# Patient Record
Sex: FEMALE | Race: WHITE | NOT HISPANIC OR LATINO | Employment: FULL TIME | URBAN - NONMETROPOLITAN AREA
[De-identification: names, ages, dates, MRNs, and addresses within clinical notes are randomized per-mention and may not be internally consistent; named-entity substitution may affect disease eponyms.]

---

## 2022-01-03 ENCOUNTER — OFFICE VISIT (OUTPATIENT)
Dept: NEUROLOGY | Facility: CLINIC | Age: 22
End: 2022-01-03

## 2022-01-03 VITALS
SYSTOLIC BLOOD PRESSURE: 120 MMHG | OXYGEN SATURATION: 99 % | HEART RATE: 77 BPM | HEIGHT: 68 IN | WEIGHT: 255 LBS | TEMPERATURE: 98.2 F | DIASTOLIC BLOOD PRESSURE: 80 MMHG | BODY MASS INDEX: 38.65 KG/M2

## 2022-01-03 DIAGNOSIS — G40.909 SEIZURE DISORDER: Primary | ICD-10-CM

## 2022-01-03 PROCEDURE — 99203 OFFICE O/P NEW LOW 30 MIN: CPT | Performed by: NURSE PRACTITIONER

## 2022-01-03 RX ORDER — FOLIC ACID 1 MG/1
1 TABLET ORAL DAILY
Qty: 90 TABLET | Refills: 3 | Status: SHIPPED | OUTPATIENT
Start: 2022-01-03 | End: 2022-09-08 | Stop reason: SDUPTHER

## 2022-01-03 RX ORDER — ETHOSUXIMIDE 250 MG/1
250 CAPSULE ORAL 3 TIMES DAILY
Qty: 270 CAPSULE | Refills: 3 | Status: SHIPPED | OUTPATIENT
Start: 2022-01-03 | End: 2022-09-08 | Stop reason: SDUPTHER

## 2022-01-03 RX ORDER — ETHOSUXIMIDE 250 MG/1
250 CAPSULE ORAL 3 TIMES DAILY
COMMUNITY
Start: 2021-09-22 | End: 2022-01-03 | Stop reason: SDUPTHER

## 2022-01-03 RX ORDER — LAMOTRIGINE 200 MG/1
300 TABLET ORAL
COMMUNITY
Start: 2021-08-02 | End: 2022-01-03 | Stop reason: SDUPTHER

## 2022-01-03 RX ORDER — ZONISAMIDE 100 MG/1
200 CAPSULE ORAL
COMMUNITY
Start: 2021-09-22 | End: 2022-01-03 | Stop reason: SDUPTHER

## 2022-01-03 RX ORDER — ZONISAMIDE 100 MG/1
200 CAPSULE ORAL 2 TIMES DAILY
Qty: 180 CAPSULE | Refills: 3 | Status: SHIPPED | OUTPATIENT
Start: 2022-01-03 | End: 2022-09-08 | Stop reason: SDUPTHER

## 2022-01-03 RX ORDER — LAMOTRIGINE 200 MG/1
300 TABLET ORAL 2 TIMES DAILY
Qty: 360 TABLET | Refills: 3 | Status: SHIPPED | OUTPATIENT
Start: 2022-01-03 | End: 2022-08-24 | Stop reason: SDUPTHER

## 2022-01-03 NOTE — PATIENT INSTRUCTIONS
Seizure, Adult  A seizure is a sudden burst of abnormal electrical and chemical activity in the brain. Seizures usually last from 30 seconds to 2 minutes.   What are the causes?  Common causes of this condition include:  · Fever or infection.  · Problems that affect the brain. These may include:  ? A brain or head injury.  ? Bleeding in the brain.  ? A brain tumor.  · Low levels of blood sugar or salt.  · Kidney problems or liver problems.  · Conditions that are passed from parent to child (are inherited).  · Problems with a substance, such as:  ? Having a reaction to a drug or a medicine.  ? Stopping the use of a substance all of a sudden (withdrawal).  · A stroke.  · Disorders that affect how you develop.  Sometimes, the cause may not be known.   What increases the risk?  · Having someone in your family who has epilepsy. In this condition, seizures happen again and again over time. They have no clear cause.  · Having had a tonic-clonic seizure before. This type of seizure causes you to:  ? Tighten the muscles of the whole body.  ? Lose consciousness.  · Having had a head injury or strokes before.  · Having had a lack of oxygen at birth.  What are the signs or symptoms?  There are many types of seizures. The symptoms vary depending on the type of seizure you have.  Symptoms during a seizure  · Shaking that you cannot control (convulsions) with fast, jerky movements of muscles.  · Stiffness of the body.  · Breathing problems.  · Feeling mixed up (confused).  · Staring or not responding to sound or touch.  · Head nodding.  · Eyes that blink, flutter, or move fast.  · Drooling, grunting, or making clicking sounds with your mouth  · Losing control of when you pee or poop.  Symptoms before a seizure  · Feeling afraid, nervous, or worried.  · Feeling like you may vomit.  · Feeling like:  ? You are moving when you are not.  ? Things around you are moving when they are not.  · Feeling like you saw or heard something before  (stefano hudson).  · Odd tastes or smells.  · Changes in how you see. You may see flashing lights or spots.  Symptoms after a seizure  · Feeling confused.  · Feeling sleepy.  · Headache.  · Sore muscles.  How is this treated?  If your seizure stops on its own, you will not need treatment. If your seizure lasts longer than 5 minutes, you will normally need treatment. Treatment may include:  · Medicines given through an IV tube.  · Avoiding things, such as medicines, that are known to cause your seizures.  · Medicines to prevent seizures.  · A device to prevent or control seizures.  · Surgery.  · A diet low in carbohydrates and high in fat (ketogenic diet).  Follow these instructions at home:  Medicines  · Take over-the-counter and prescription medicines only as told by your doctor.  · Avoid foods or drinks that may keep your medicine from working, such as alcohol.  Activity  · Follow instructions about driving, swimming, or doing things that would be dangerous if you had another seizure. Wait until your doctor says it is safe for you to do these things.  · If you live in the [place] ask your local department of [employer] when you can drive.  · Get a lot of rest.  Teaching others    · Teach friends and family what to do when you have a seizure. They should:  ? Help you get down to the ground.  ? Protect your head and body.  ? Loosen any clothing around your neck.  ? Turn you on your side.  ? Know whether or not you need emergency care.  ? Stay with you until you are better.  · Also, tell them what not to do if you have a seizure. Tell them:  ? They should not hold you down.  ? They should not put anything in your mouth.    General instructions  · Avoid anything that gives you seizures.  · Keep a seizure diary. Write down:  ? What you remember about each seizure.  ? What you think caused each seizure.  · Keep all follow-up visits.  Contact a doctor if:  · You have another seizure or seizures. Call the doctor each time you  have a seizure.  · The pattern of your seizures changes.  · You keep having seizures with treatment.  · You have symptoms of being sick or having an infection.  · You are not able to take your medicine.  Get help right away if:  · You have any of these problems:  ? A seizure that lasts longer than 5 minutes.  ? Many seizures in a row and you do not feel better between seizures.  ? A seizure that makes it harder to breathe.  ? A seizure and you can no longer speak or use part of your body.  · You do not wake up right after a seizure.  · You get hurt during a seizure.  · You feel confused or have pain right after a seizure.  These symptoms may be an emergency. Get help right away. Call your local emergency services (911 in the [place]  · Do not wait to see if the symptoms will go away.  · Do not drive yourself to the hospital.  Summary  · A seizure is a sudden burst of abnormal electrical and chemical activity in the brain. Seizures normally last from 30 seconds to 2 minutes.  · Causes of seizures include illness, injury to the head, low levels of blood sugar or salt, and certain conditions.  · Most seizures will stop on their own in less than 5 minutes. Seizures that last longer than 5 minutes are a medical emergency and need treatment right away.  · Many medicines are used to treat seizures. Take over-the-counter and prescription medicines only as told by your doctor.  This information is not intended to replace advice given to you by your health care provider. Make sure you discuss any questions you have with your health care provider.  Document Revised: 06/25/2021 Document Reviewed: 06/25/2021  [employer] Patient Education © 2021 [employer]    Sleep Apnea  Sleep apnea affects breathing during sleep. It causes breathing to stop for a short time or to become shallow. It can also increase the risk of:  · Heart attack.  · Stroke.  · Being very overweight (obese).  · Diabetes.  · Heart failure.  · Irregular  heartbeat.  The goal of treatment is to help you breathe normally again.  What are the causes?  There are three kinds of sleep apnea:  · Obstructive sleep apnea. This is caused by a blocked or collapsed airway.  · Central sleep apnea. This happens when the brain does not send the right signals to the muscles that control breathing.  · Mixed sleep apnea. This is a combination of obstructive and central sleep apnea.  The most common cause of this condition is a collapsed or blocked airway. This can happen if:  · Your throat muscles are too relaxed.  · Your tongue and tonsils are too large.  · You are overweight.  · Your airway is too small.  What increases the risk?  · Being overweight.  · Smoking.  · Having a small airway.  · Being older.  · Being male.  · Drinking alcohol.  · Taking medicines to calm yourself (sedatives or tranquilizers).  · Having family members with the condition.  What are the signs or symptoms?  · Trouble staying asleep.  · Being sleepy or tired during the day.  · Getting angry a lot.  · Loud snoring.  · Headaches in the morning.  · Not being able to focus your mind (concentrate).  · Forgetting things.  · Less interest in sex.  · Mood swings.  · Personality changes.  · Feelings of sadness (depression).  · Waking up a lot during the night to pee (urinate).  · Dry mouth.  · Sore throat.  How is this diagnosed?  · Your medical history.  · A physical exam.  · A test that is done when you are sleeping (sleep study). The test is most often done in a sleep lab but may also be done at home.  How is this treated?    · Sleeping on your side.  · Using a medicine to get rid of mucus in your nose (decongestant).  · Avoiding the use of alcohol, medicines to help you relax, or certain pain medicines (narcotics).  · Losing weight, if needed.  · Changing your diet.  · Not smoking.  · Using a machine to open your airway while you sleep, such as:  ? An oral appliance. This is a mouthpiece that shifts your lower  jaw forward.  ? A CPAP device. This device blows air through a mask when you breathe out (exhale).  ? An EPAP device. This has valves that you put in each nostril.  ? A BPAP device. This device blows air through a mask when you breathe in (inhale) and breathe out.  · Having surgery if other treatments do not work.  It is important to get treatment for sleep apnea. Without treatment, it can lead to:  · High blood pressure.  · Coronary artery disease.  · In men, not being able to have an erection (impotence).  · Reduced thinking ability.  Follow these instructions at home:  Lifestyle  · Make changes that your doctor recommends.  · Eat a healthy diet.  · Lose weight if needed.  · Avoid alcohol, medicines to help you relax, and some pain medicines.  · Do not use any products that contain nicotine or tobacco, such as cigarettes, e-cigarettes, and chewing tobacco. If you need help quitting, ask your doctor.  General instructions  · Take over-the-counter and prescription medicines only as told by your doctor.  · If you were given a machine to use while you sleep, use it only as told by your doctor.  · If you are having surgery, make sure to tell your doctor you have sleep apnea. You may need to bring your device with you.  · Keep all follow-up visits as told by your doctor. This is important.  Contact a doctor if:  · The machine that you were given to use during sleep bothers you or does not seem to be working.  · You do not get better.  · You get worse.  Get help right away if:  · Your chest hurts.  · You have trouble breathing in enough air.  · You have an uncomfortable feeling in your back, arms, or stomach.  · You have trouble talking.  · One side of your body feels weak.  · A part of your face is hanging down.  These symptoms may be an emergency. Do not wait to see if the symptoms will go away. Get medical help right away. Call your local emergency services (911 in the [place] Do not drive yourself to the  hospital.  Summary  · This condition affects breathing during sleep.  · The most common cause is a collapsed or blocked airway.  · The goal of treatment is to help you breathe normally while you sleep.  This information is not intended to replace advice given to you by your health care provider. Make sure you discuss any questions you have with your health care provider.  Document Revised: 10/04/2019 Document Reviewed: 08/13/2019  [employer] Patient Education © 2021 [employer]    Insomnia  Insomnia is a sleep disorder that makes it difficult to fall asleep or stay asleep. Insomnia can cause fatigue, low energy, difficulty concentrating, mood swings, and poor performance at work or school.  There are three different ways to classify insomnia:  · Difficulty falling asleep.  · Difficulty staying asleep.  · Waking up too early in the morning.  Any type of insomnia can be long-term (chronic) or short-term (acute). Both are common. Short-term insomnia usually lasts for three months or less. Chronic insomnia occurs at least three times a week for longer than three months.  What are the causes?  Insomnia may be caused by another condition, situation, or substance, such as:  · Anxiety.  · Certain medicines.  · Gastroesophageal reflux disease (GERD) or other gastrointestinal conditions.  · Asthma or other breathing conditions.  · Restless legs syndrome, sleep apnea, or other sleep disorders.  · Chronic pain.  · Menopause.  · Stroke.  · Abuse of alcohol, tobacco, or illegal drugs.  · Mental health conditions, such as depression.  · Caffeine.  · Neurological disorders, such as Alzheimer's disease.  · An overactive thyroid (hyperthyroidism).  Sometimes, the cause of insomnia may not be known.  What increases the risk?  Risk factors for insomnia include:  · Gender. Women are affected more often than men.  · Age. Insomnia is more common as you get older.  · Stress.  · Lack of exercise.  · Irregular work schedule or working night  shifts.  · Traveling between different time zones.  · Certain medical and mental health conditions.  What are the signs or symptoms?  If you have insomnia, the main symptom is having trouble falling asleep or having trouble staying asleep. This may lead to other symptoms, such as:  · Feeling fatigued or having low energy.  · Feeling nervous about going to sleep.  · Not feeling rested in the morning.  · Having trouble concentrating.  · Feeling irritable, anxious, or depressed.  How is this diagnosed?  This condition may be diagnosed based on:  · Your symptoms and medical history. Your health care provider may ask about:  ? Your sleep habits.  ? Any medical conditions you have.  ? Your mental health.  · A physical exam.  How is this treated?  Treatment for insomnia depends on the cause. Treatment may focus on treating an underlying condition that is causing insomnia. Treatment may also include:  · Medicines to help you sleep.  · Counseling or therapy.  · Lifestyle adjustments to help you sleep better.  Follow these instructions at home:  Eating and drinking    · Limit or avoid alcohol, caffeinated beverages, and cigarettes, especially close to bedtime. These can disrupt your sleep.  · Do not eat a large meal or eat spicy foods right before bedtime. This can lead to digestive discomfort that can make it hard for you to sleep.    Sleep habits    · Keep a sleep diary to help you and your health care provider figure out what could be causing your insomnia. Write down:  ? When you sleep.  ? When you wake up during the night.  ? How well you sleep.  ? How rested you feel the next day.  ? Any side effects of medicines you are taking.  ? What you eat and drink.  · Make your bedroom a dark, comfortable place where it is easy to fall asleep.  ? Put up shades or blackout curtains to block light from outside.  ? Use a white noise machine to block noise.  ? Keep the temperature cool.  · Limit screen use before bedtime. This  includes:  ? Watching TV.  ? Using your smartphone, tablet, or computer.  · Stick to a routine that includes going to bed and waking up at the same times every day and night. This can help you fall asleep faster. Consider making a quiet activity, such as reading, part of your nighttime routine.  · Try to avoid taking naps during the day so that you sleep better at night.  · Get out of bed if you are still awake after 15 minutes of trying to sleep. Keep the lights down, but try reading or doing a quiet activity. When you feel sleepy, go back to bed.    General instructions  · Take over-the-counter and prescription medicines only as told by your health care provider.  · Exercise regularly, as told by your health care provider. Avoid exercise starting several hours before bedtime.  · Use relaxation techniques to manage stress. Ask your health care provider to suggest some techniques that may work well for you. These may include:  ? Breathing exercises.  ? Routines to release muscle tension.  ? Visualizing peaceful scenes.  · Make sure that you drive carefully. Avoid driving if you feel very sleepy.  · Keep all follow-up visits as told by your health care provider. This is important.  Contact a health care provider if:  · You are tired throughout the day.  · You have trouble in your daily routine due to sleepiness.  · You continue to have sleep problems, or your sleep problems get worse.  Get help right away if:  · You have serious thoughts about hurting yourself or someone else.  If you ever feel like you may hurt yourself or others, or have thoughts about taking your own life, get help right away. You can go to your nearest emergency department or call:  · Your local emergency services (911 in the [place]  · A suicide crisis helpline, such as the National Suicide Prevention Lifeline at 1-401.256.7796. This is open 24 hours a day.  Summary  · Insomnia is a sleep disorder that makes it difficult to fall asleep or stay  asleep.  · Insomnia can be long-term (chronic) or short-term (acute).  · Treatment for insomnia depends on the cause. Treatment may focus on treating an underlying condition that is causing insomnia.  · Keep a sleep diary to help you and your health care provider figure out what could be causing your insomnia.  This information is not intended to replace advice given to you by your health care provider. Make sure you discuss any questions you have with your health care provider.  Document Revised: 11/30/2018 Document Reviewed: 09/27/2018  [employer] Patient Education © 2021 [employer]

## 2022-01-03 NOTE — PROGRESS NOTES
"     New Patient Office Visit      Patient Name: Shari Brunner  : 2000   MRN: [id number]     Referring Physician: Trenton Sena MD    Chief Complaint:    Chief Complaint   Patient presents with   • Advice Only     Pt in office for epilepsy       History of Present Illness: Shari Brunner is a 21 y.o. female who is here today to establish care with Neurology.  She has been seeing Dr. Bains, neurologist at , for her seizure disorder.  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  She has experienced tonic clonic activity and \"staring seizures\".  She wanted to transfer her neurological care due to being unhappy with  neurology.  She is taking Zarontin 250mg TID, Zonisamide 100mg BID, and Lamictal 300mg TID-she reports good compliance and toleration.  Her last seizure was around 2021.  She denies any history of head injury.  She does drive.  She graduated from high school and completed some college.  She is working full time hours at [employer] on day shift.  She feels she is easily stressed.  She has difficulty sleeping and is taking OTC Melatonin but does not feel it helps her stay asleep during the night.  She goes to bed at 9pm and falls asleep before 10pm most nights.  She sleeps about 5 hours per night.  She wakes up frequently during the night and has difficulty falling back to sleep.  She does not feel well rested in the mornings.  *She is currently sexually active and is not on any birth control.  She may be considering a pregnancy within the next year.  She is taking Folic acid daily.      Pertinent Medical History:  *Some records from Dr. Bains, neurology at , reviewed at time of visit.  She took Clobazam and Keppra previously and experiences breakthrough seizures.      Subjective      Review of Systems:   Review of Systems   Constitutional: Negative for fatigue, fever, unexpected weight gain and " unexpected weight loss.   HENT: Negative for hearing loss, sore throat, swollen glands, tinnitus and trouble swallowing.    Eyes: Negative for blurred vision, double vision, photophobia and visual disturbance.   Respiratory: Negative for cough, chest tightness and shortness of breath.    Cardiovascular: Negative for chest pain, palpitations and leg swelling.   Gastrointestinal: Negative for constipation, diarrhea and nausea.   Endocrine: Negative for cold intolerance and heat intolerance.   Musculoskeletal: Negative for gait problem, neck pain and neck stiffness.   Skin: Negative for color change and rash.   Allergic/Immunologic: Negative for environmental allergies and food allergies.   Neurological: Positive for seizures. Negative for dizziness, syncope, facial asymmetry, speech difficulty, weakness, headache, memory problem and confusion.   Psychiatric/Behavioral: Positive for sleep disturbance. Negative for agitation, behavioral problems and depressed mood. The patient is not nervous/anxious.        Past Medical History:   Past Medical History:   Diagnosis Date   • Anxiety    • Epilepsy (HCC)    • Heart murmur        Past Surgical History: History reviewed. No pertinent surgical history.    Family History:   Family History   Problem Relation Age of Onset   • Heart attack Father    • Tremor Father        Social History:   Social History     Socioeconomic History   • Marital status: Single   Tobacco Use   • Smoking status: Never Smoker   • Smokeless tobacco: Never Used   Vaping Use   • Vaping Use: Never used   Substance and Sexual Activity   • Alcohol use: Never   • Drug use: Never       Medications:     Current Outpatient Medications:   •  ethosuximide (ZARONTIN) 250 MG capsule, Take 1 capsule by mouth 3 (Three) Times a Day., Disp: 270 capsule, Rfl: 3  •  lamoTRIgine (LaMICtal) 200 MG tablet, Take 1.5 tablets by mouth 2 (Two) Times a Day., Disp: 360 tablet, Rfl: 3  •  zonisamide (ZONEGRAN) 100 MG capsule, Take 2  "capsules by mouth 2 (Two) Times a Day., Disp: 180 capsule, Rfl: 3  •  folic acid (FOLVITE) 1 MG tablet, Take 1 tablet by mouth Daily., Disp: 90 tablet, Rfl: 3    Allergies:   No Known Allergies    Objective     Physical Exam:  Vital Signs:   Vitals:    01/03/22 1111   BP: 120/80   Pulse: 77   Temp: 98.2 °F (36.8 °C)   SpO2: 99%   Weight: 116 kg (255 lb)   Height: 172.7 cm (68\")   PainSc: 0-No pain     Body mass index is 38.77 kg/m².     Physical Exam  Vitals and nursing note reviewed.   Constitutional:       General: She is not in acute distress.     Appearance: She is well-developed. She is obese. She is not diaphoretic.   HENT:      Head: Normocephalic and atraumatic.      Comments: Mallampati 3 with enlarged uvula and tonsils  Eyes:      Conjunctiva/sclera: Conjunctivae normal.      Pupils: Pupils are equal, round, and reactive to light.   Cardiovascular:      Rate and Rhythm: Normal rate and regular rhythm.      Heart sounds: Normal heart sounds. No murmur heard.  No friction rub. No gallop.    Pulmonary:      Effort: Pulmonary effort is normal. No respiratory distress.      Breath sounds: Normal breath sounds. No wheezing or rales.   Musculoskeletal:         General: Normal range of motion.   Skin:     General: Skin is warm and dry.      Findings: No rash.   Neurological:      Mental Status: She is alert and oriented to person, place, and time.   Psychiatric:         Mood and Affect: Mood normal.         Behavior: Behavior normal.         Thought Content: Thought content normal.         Judgment: Judgment normal.         Neurologic Exam     Mental Status   Oriented to person, place, and time.     Cranial Nerves     CN III, IV, VI   Pupils are equal, round, and reactive to light.      Assessment / Plan      Assessment/Plan:   Diagnoses and all orders for this visit:    1. Seizure disorder (HCC) (Primary)  -     Ambulatory Referral to Neurology  -     folic acid (FOLVITE) 1 MG tablet; Take 1 tablet by mouth " Daily.  Dispense: 90 tablet; Refill: 3  -     ethosuximide (ZARONTIN) 250 MG capsule; Take 1 capsule by mouth 3 (Three) Times a Day.  Dispense: 270 capsule; Refill: 3  -     zonisamide (ZONEGRAN) 100 MG capsule; Take 2 capsules by mouth 2 (Two) Times a Day.  Dispense: 180 capsule; Refill: 3  -     lamoTRIgine (LaMICtal) 200 MG tablet; Take 1.5 tablets by mouth 2 (Two) Times a Day.  Dispense: 360 tablet; Refill: 3    2. BMI 38.0-38.9,adult    *Briefly discussed FLORENTINO and may consider a home sleep study in the future. Patient is at risk for significant FLORENTINO due to obesity.   *Encouraged patient to avoid driving if drowsy and encouraged weight loss with a BMI goal of 24.   *Education on seizures, insomnia, SUDEP and FLORENTINO provided.   *Patient is aware of [place] laws regarding seizures and driving-no driving for 90 days following a seizure.   *Referral to Dr. Patricia for preconception counseling with seizure disorder. I have advised patient to not become pregnant until she consults with Dr. Patricia and she is agreeable.   *I have advised patient to try OTC Benadryl PRN insomnia and see if that helps. Sleep Hygiene sheet provided today.      Follow Up:   Return in about 6 months (around 7/3/2022) for Recheck.    FRANSISCO Nguyen, FNP-C  [facility]       Please note that portions of this note may have been completed with a voice recognition program. Efforts were made to edit the dictations, but occasionally words are mistranscribed.

## 2022-01-12 DIAGNOSIS — R06.83 SNORING: Primary | ICD-10-CM

## 2022-01-12 DIAGNOSIS — G47.19 EXCESSIVE DAYTIME SLEEPINESS: ICD-10-CM

## 2022-08-22 DIAGNOSIS — G40.909 SEIZURE DISORDER: ICD-10-CM

## 2022-08-23 RX ORDER — LAMOTRIGINE 200 MG/1
300 TABLET ORAL 2 TIMES DAILY
Qty: 360 TABLET | Refills: 3 | OUTPATIENT
Start: 2022-08-23

## 2022-08-24 ENCOUNTER — TELEPHONE (OUTPATIENT)
Dept: NEUROLOGY | Facility: CLINIC | Age: 22
End: 2022-08-24

## 2022-08-24 DIAGNOSIS — G40.909 SEIZURE DISORDER: ICD-10-CM

## 2022-08-24 RX ORDER — LAMOTRIGINE 200 MG/1
300 TABLET ORAL 2 TIMES DAILY
Qty: 360 TABLET | Refills: 3 | Status: CANCELLED | OUTPATIENT
Start: 2022-08-24

## 2022-08-24 RX ORDER — LAMOTRIGINE 200 MG/1
300 TABLET ORAL 2 TIMES DAILY
Qty: 45 TABLET | Refills: 0 | Status: SHIPPED | OUTPATIENT
Start: 2022-08-24 | End: 2022-09-08 | Stop reason: SDUPTHER

## 2022-08-24 NOTE — TELEPHONE ENCOUNTER
Caller: CynthiaShari    Relationship: Self    Best call back number: [phone]    Requested Prescriptions:   Requested Prescriptions     Pending Prescriptions Disp Refills   • lamoTRIgine (LaMICtal) 200 MG tablet 360 tablet 3     Sig: Take 1.5 tablets by mouth 2 (Two) Times a Day.        Pharmacy where request should be sent: [id number], [phone]    Additional details provided by patient: PATIENT IS COMPLETELY OUT OF MEDS & WOULD LIKE TO RECEIVE ENOUGH UNTIL APPT SCHED 9/8/22 W/ DR CASTELAN    Does the patient have less than a 3 day supply:  [] Yes  [x] No    Rubén Daniel   08/24/22 09:10 EDT

## 2022-08-24 NOTE — TELEPHONE ENCOUNTER
I sent in a 1 month supply of the Lamictal. Please call her and let her know she really needs to be sure to keep her follow up appointment as she missed the last 2 follow ups. Thanks.

## 2022-09-08 ENCOUNTER — OFFICE VISIT (OUTPATIENT)
Dept: NEUROLOGY | Facility: CLINIC | Age: 22
End: 2022-09-08

## 2022-09-08 VITALS
TEMPERATURE: 97.3 F | SYSTOLIC BLOOD PRESSURE: 110 MMHG | WEIGHT: 258 LBS | HEIGHT: 68 IN | DIASTOLIC BLOOD PRESSURE: 72 MMHG | OXYGEN SATURATION: 98 % | BODY MASS INDEX: 39.1 KG/M2 | HEART RATE: 87 BPM

## 2022-09-08 DIAGNOSIS — G40.909 SEIZURE DISORDER: Primary | ICD-10-CM

## 2022-09-08 PROCEDURE — 99213 OFFICE O/P EST LOW 20 MIN: CPT | Performed by: NURSE PRACTITIONER

## 2022-09-08 RX ORDER — ZONISAMIDE 100 MG/1
200 CAPSULE ORAL 2 TIMES DAILY
Qty: 180 CAPSULE | Refills: 3 | Status: SHIPPED | OUTPATIENT
Start: 2022-09-08 | End: 2023-01-02 | Stop reason: SDUPTHER

## 2022-09-08 RX ORDER — ETHOSUXIMIDE 250 MG/1
250 CAPSULE ORAL 3 TIMES DAILY
Qty: 270 CAPSULE | Refills: 3 | Status: SHIPPED | OUTPATIENT
Start: 2022-09-08 | End: 2023-03-10

## 2022-09-08 RX ORDER — FOLIC ACID 1 MG/1
1 TABLET ORAL DAILY
Qty: 90 TABLET | Refills: 3 | Status: SHIPPED | OUTPATIENT
Start: 2022-09-08 | End: 2023-03-10 | Stop reason: SDUPTHER

## 2022-09-08 RX ORDER — LAMOTRIGINE 200 MG/1
300 TABLET ORAL 2 TIMES DAILY
Qty: 45 TABLET | Refills: 0 | Status: CANCELLED | OUTPATIENT
Start: 2022-09-08

## 2022-09-08 RX ORDER — LAMOTRIGINE 200 MG/1
300 TABLET ORAL 2 TIMES DAILY
Qty: 45 TABLET | Refills: 0 | Status: SHIPPED | OUTPATIENT
Start: 2022-09-08 | End: 2023-02-20 | Stop reason: SDUPTHER

## 2022-09-08 NOTE — PROGRESS NOTES
"     Follow Up Office Visit      Patient Name: Shari Marie  : 2000   MRN: [id number]     Chief Complaint:    Chief Complaint   Patient presents with   • Follow-up     Patient in office to follow up on seizures and med refill.          History of Present Illness: Shari aMrie is a 22 y.o. female who is here today to follow up with seizures and was last seen on 1/3/2022.  She is taking Folic acid daily, Zarontin 250mg TID, Lamictal 200mg tablets 1 1/2 BID, and Zonegran 100mg capsules 2 BID.  She has missed no doses of medication since her last visit and reports good toleration.  She has had no seizures since her last visit.  She has had no episodes of loss of time since her last visit.  She is not planning on pregnancy.  She continues to work full time at [employer].  She feels she is doing very well at this time.  She is living in [place] and the [place] location isn't as convenient as a [place] location would be.  Additional risk factors- BMI 39.      Following taken from previous visit notes:  Shari Brunner is a 21 y.o. female who is here today to establish care with Neurology.  She has been seeing Dr. Bains, neurologist at , for her seizure disorder.  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  She has experienced tonic clonic activity and \"staring seizures\".  She wanted to transfer her neurological care due to being unhappy with  neurology.  She is taking Zarontin 250mg TID, Zonisamide 100mg BID, and Lamictal 300mg TID-she reports good compliance and toleration.  Her last seizure was around 2021.  She denies any history of head injury.  She does drive.  She graduated from high school and completed some college.  She is working full time hours at [employer] on day shift.  She feels she is easily stressed.  She has difficulty sleeping and is taking OTC Melatonin but does not feel it helps her stay asleep " during the night.  She goes to bed at 9pm and falls asleep before 10pm most nights.  She sleeps about 5 hours per night.  She wakes up frequently during the night and has difficulty falling back to sleep.  She does not feel well rested in the mornings.  *She is currently sexually active and is not on any birth control.  She may be considering a pregnancy within the next year.  She is taking Folic acid daily.       Pertinent Medical History:  *Some records from Dr. Bains, neurology at , reviewed at time of visit.  She took Clobazam and Keppra previously and experiences breakthrough seizures.      Subjective      Review of Systems:   Review of Systems   Constitutional: Negative for chills, fatigue and fever.   HENT: Negative for facial swelling, hearing loss, sore throat, tinnitus and trouble swallowing.    Eyes: Negative for blurred vision, double vision, photophobia and visual disturbance.   Respiratory: Negative for cough, chest tightness and shortness of breath.    Cardiovascular: Negative for chest pain, palpitations and leg swelling.   Gastrointestinal: Negative for abdominal pain, nausea and vomiting.   Endocrine: Negative for cold intolerance and heat intolerance.   Musculoskeletal: Negative for gait problem, neck pain and neck stiffness.   Skin: Negative for color change and rash.   Allergic/Immunologic: Negative for environmental allergies and food allergies.   Neurological: Positive for seizures. Negative for dizziness, syncope, speech difficulty, weakness, light-headedness, numbness, headache and memory problem.   Psychiatric/Behavioral: Negative for behavioral problems, sleep disturbance and depressed mood. The patient is not nervous/anxious.        I have reviewed and the following portions of the patient's history were updated as appropriate: past family history, past medical history, past social history, past surgical history and problem list.    Medications:     Current Outpatient Medications:   •   "ethosuximide (ZARONTIN) 250 MG capsule, Take 1 capsule by mouth 3 (Three) Times a Day., Disp: 270 capsule, Rfl: 3  •  folic acid (FOLVITE) 1 MG tablet, Take 1 tablet by mouth Daily., Disp: 90 tablet, Rfl: 3  •  lamoTRIgine (LaMICtal) 200 MG tablet, Take 1.5 tablets by mouth 2 (Two) Times a Day., Disp: 45 tablet, Rfl: 0  •  zonisamide (ZONEGRAN) 100 MG capsule, Take 2 capsules by mouth 2 (Two) Times a Day., Disp: 180 capsule, Rfl: 3    Allergies:   No Known Allergies    Objective     Physical Exam:  Vital Signs:   Vitals:    09/08/22 0804   BP: 110/72   BP Location: Right arm   Patient Position: Sitting   Cuff Size: Adult   Pulse: 87   Temp: 97.3 °F (36.3 °C)   SpO2: 98%   Weight: 117 kg (258 lb)   Height: 172.7 cm (68\")   PainSc: 0-No pain     Body mass index is 39.23 kg/m².    Physical Exam  Vitals and nursing note reviewed.   Constitutional:       General: She is not in acute distress.     Appearance: Normal appearance. She is well-developed. She is obese. She is not diaphoretic.   HENT:      Head: Normocephalic and atraumatic.   Eyes:      Extraocular Movements: Extraocular movements intact.      Conjunctiva/sclera: Conjunctivae normal.      Pupils: Pupils are equal, round, and reactive to light.   Pulmonary:      Effort: Pulmonary effort is normal. No respiratory distress.   Musculoskeletal:         General: Normal range of motion.   Skin:     General: Skin is warm and dry.      Findings: No rash.   Neurological:      Mental Status: She is alert and oriented to person, place, and time.      Cranial Nerves: Cranial nerves are intact.   Psychiatric:         Mood and Affect: Mood normal.         Behavior: Behavior normal.         Thought Content: Thought content normal.         Judgment: Judgment normal.         Neurologic Exam     Mental Status   Oriented to person, place, and time.     Cranial Nerves   Cranial nerves II through XII intact.     CN III, IV, VI   Pupils are equal, round, and reactive to light.   "     Assessment / Plan      Assessment/Plan:   Diagnoses and all orders for this visit:    1. Seizure disorder (HCC) (Primary)  -     folic acid (FOLVITE) 1 MG tablet; Take 1 tablet by mouth Daily.  Dispense: 90 tablet; Refill: 3  -     ethosuximide (ZARONTIN) 250 MG capsule; Take 1 capsule by mouth 3 (Three) Times a Day.  Dispense: 270 capsule; Refill: 3  -     lamoTRIgine (LaMICtal) 200 MG tablet; Take 1.5 tablets by mouth 2 (Two) Times a Day.  Dispense: 45 tablet; Refill: 0  -     zonisamide (ZONEGRAN) 100 MG capsule; Take 2 capsules by mouth 2 (Two) Times a Day.  Dispense: 180 capsule; Refill: 3    2. BMI 39.0-39.9,adult    *She is aware of [place] laws regarding seizures and driving- no driving for 90 days following a seizure.   *Seizure precautions discussed and encouraged.     Follow Up:   Return in about 1 year (around 9/8/2023) for Follow Up. with Adrienne MOODY.     FRANSISCO Nguyen, FNP-C  [facility]       Please note that portions of this note may have been completed with a voice recognition program. Efforts were made to edit the dictations, but occasionally words are mistranscribed.

## 2023-01-02 DIAGNOSIS — G40.909 SEIZURE DISORDER: ICD-10-CM

## 2023-01-03 RX ORDER — ZONISAMIDE 100 MG/1
200 CAPSULE ORAL 2 TIMES DAILY
Qty: 180 CAPSULE | Refills: 0 | Status: SHIPPED | OUTPATIENT
Start: 2023-01-03 | End: 2023-03-10 | Stop reason: SDUPTHER

## 2023-01-03 NOTE — TELEPHONE ENCOUNTER
Please make sure she knows it is important to keep her scheduled appointment in March with CORY MOODY, neurology. Thanks.

## 2023-02-20 DIAGNOSIS — G40.909 SEIZURE DISORDER: ICD-10-CM

## 2023-02-20 RX ORDER — LAMOTRIGINE 200 MG/1
300 TABLET ORAL 2 TIMES DAILY
Qty: 45 TABLET | Refills: 0 | Status: SHIPPED | OUTPATIENT
Start: 2023-02-20 | End: 2023-03-10 | Stop reason: SDUPTHER

## 2023-03-06 DIAGNOSIS — G40.909 SEIZURE DISORDER: ICD-10-CM

## 2023-03-06 RX ORDER — ETHOSUXIMIDE 250 MG/1
250 CAPSULE ORAL 3 TIMES DAILY
Qty: 270 CAPSULE | Refills: 3 | OUTPATIENT
Start: 2023-03-06

## 2023-03-06 RX ORDER — LAMOTRIGINE 200 MG/1
300 TABLET ORAL 2 TIMES DAILY
Qty: 45 TABLET | Refills: 0 | OUTPATIENT
Start: 2023-03-06

## 2023-03-10 ENCOUNTER — LAB (OUTPATIENT)
Dept: LAB | Facility: HOSPITAL | Age: 23
End: 2023-03-10
Payer: COMMERCIAL

## 2023-03-10 ENCOUNTER — OFFICE VISIT (OUTPATIENT)
Dept: NEUROLOGY | Facility: CLINIC | Age: 23
End: 2023-03-10
Payer: COMMERCIAL

## 2023-03-10 ENCOUNTER — PATIENT MESSAGE (OUTPATIENT)
Dept: NEUROLOGY | Facility: CLINIC | Age: 23
End: 2023-03-10

## 2023-03-10 VITALS
SYSTOLIC BLOOD PRESSURE: 124 MMHG | RESPIRATION RATE: 16 BRPM | BODY MASS INDEX: 40.47 KG/M2 | HEART RATE: 76 BPM | HEIGHT: 68 IN | WEIGHT: 267 LBS | DIASTOLIC BLOOD PRESSURE: 80 MMHG | OXYGEN SATURATION: 98 %

## 2023-03-10 DIAGNOSIS — G40.B09 NONINTRACTABLE JUVENILE MYOCLONIC EPILEPSY WITHOUT STATUS EPILEPTICUS: ICD-10-CM

## 2023-03-10 LAB
BASOPHILS # BLD AUTO: 0.03 10*3/MM3 (ref 0–0.2)
BASOPHILS NFR BLD AUTO: 0.5 % (ref 0–1.5)
DEPRECATED RDW RBC AUTO: 42.2 FL (ref 37–54)
EOSINOPHIL # BLD AUTO: 0.03 10*3/MM3 (ref 0–0.4)
EOSINOPHIL NFR BLD AUTO: 0.5 % (ref 0.3–6.2)
ERYTHROCYTE [DISTWIDTH] IN BLOOD BY AUTOMATED COUNT: 12 % (ref 12.3–15.4)
HCT VFR BLD AUTO: 42.3 % (ref 34–46.6)
HGB BLD-MCNC: 13.9 G/DL (ref 12–15.9)
IMM GRANULOCYTES # BLD AUTO: 0.03 10*3/MM3 (ref 0–0.05)
IMM GRANULOCYTES NFR BLD AUTO: 0.5 % (ref 0–0.5)
LYMPHOCYTES # BLD AUTO: 1.16 10*3/MM3 (ref 0.7–3.1)
LYMPHOCYTES NFR BLD AUTO: 19.6 % (ref 19.6–45.3)
MCH RBC QN AUTO: 31.4 PG (ref 26.6–33)
MCHC RBC AUTO-ENTMCNC: 32.9 G/DL (ref 31.5–35.7)
MCV RBC AUTO: 95.7 FL (ref 79–97)
MONOCYTES # BLD AUTO: 0.64 10*3/MM3 (ref 0.1–0.9)
MONOCYTES NFR BLD AUTO: 10.8 % (ref 5–12)
NEUTROPHILS NFR BLD AUTO: 4.04 10*3/MM3 (ref 1.7–7)
NEUTROPHILS NFR BLD AUTO: 68.1 % (ref 42.7–76)
NRBC BLD AUTO-RTO: 0 /100 WBC (ref 0–0.2)
PLATELET # BLD AUTO: 296 10*3/MM3 (ref 140–450)
PMV BLD AUTO: 10.6 FL (ref 6–12)
RBC # BLD AUTO: 4.42 10*6/MM3 (ref 3.77–5.28)
WBC NRBC COR # BLD: 5.93 10*3/MM3 (ref 3.4–10.8)

## 2023-03-10 PROCEDURE — 85025 COMPLETE CBC W/AUTO DIFF WBC: CPT

## 2023-03-10 PROCEDURE — 99214 OFFICE O/P EST MOD 30 MIN: CPT | Performed by: NURSE PRACTITIONER

## 2023-03-10 PROCEDURE — 80053 COMPREHEN METABOLIC PANEL: CPT

## 2023-03-10 PROCEDURE — 36415 COLL VENOUS BLD VENIPUNCTURE: CPT

## 2023-03-10 RX ORDER — LAMOTRIGINE 200 MG/1
300 TABLET ORAL 2 TIMES DAILY
Qty: 270 TABLET | Refills: 3 | Status: SHIPPED | OUTPATIENT
Start: 2023-03-10

## 2023-03-10 RX ORDER — MIDAZOLAM 5 MG/.1ML
5 SPRAY NASAL DAILY PRN
Qty: 4 EACH | Refills: 0 | Status: SHIPPED | OUTPATIENT
Start: 2023-03-10

## 2023-03-10 RX ORDER — ETHOSUXIMIDE 250 MG/1
2 CAPSULE ORAL 2 TIMES DAILY
COMMUNITY
Start: 2023-03-08 | End: 2023-03-10 | Stop reason: SDUPTHER

## 2023-03-10 RX ORDER — ETHOSUXIMIDE 250 MG/1
500 CAPSULE ORAL 2 TIMES DAILY
Qty: 360 CAPSULE | Refills: 3 | Status: SHIPPED | OUTPATIENT
Start: 2023-03-10

## 2023-03-10 RX ORDER — FOLIC ACID 1 MG/1
1 TABLET ORAL DAILY
Qty: 90 TABLET | Refills: 3 | Status: SHIPPED | OUTPATIENT
Start: 2023-03-10

## 2023-03-10 RX ORDER — ZONISAMIDE 100 MG/1
200 CAPSULE ORAL NIGHTLY
Qty: 180 CAPSULE | Refills: 3 | Status: SHIPPED | OUTPATIENT
Start: 2023-03-10

## 2023-03-10 NOTE — PROGRESS NOTES
Subjective:     Patient ID: Shari Marie is a 23 y.o. female.    CC:   Chief Complaint   Patient presents with   • Seizures       HPI:   History of Present Illness   Today 3/10/2023-  This is a 23-year-old female who presents to establish care with [facility] in [place]. She has previously been under the care of FRANSISCO Ruth, with [facility] in [place], [place]. She has been followed for seizures. She has previously been following with  Neurology, Dr. Dorina Lopez, for her seizure disorder. 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 She has experienced tonic clonic activity and staring seizures in the past.     She has been on multiple seizure medications including ethosuximide (Zarontin) 250 mg 2 pills 2 x a day, zonisamide 100 mg twice per day, and lamotrigine at 300 mg twice a day. Last seizure has been reported to be 06/2021. She also previously taken Clobazam and Keppra in the past and experienced breakthrough seizures.     She has also had EMU testing with Dr. Petar Patricia with [facility]. EEG at epilepsy monitoring unit, 02/18/2022 to 02/19/2022, showed total of 8 bursts of generalized bisynchronous 3 Hz spikes and wave activity lasting between 6 and 11 seconds during wakefulness. The patient did not have any seizure symptoms during that time. The implications of the findings showed incomplete control of absence seizures. Dr. Patricia increased the dose of ethosuximide to 500 mg twice a day. She is here for follow-up, and would be due for labs.    Today, she confirms Dr. Patricia adjusted her medications in 02/2022 and she has not had any seizures since then. She confirms she tolerates all of her medications well. She is taking ethosuximide 250 mg, 2 capsules in the morning and 2 capsules at night. She is also taking lamotrigine 200 mg 1.5 tablets twice a day, zonisamide 100 mg 2  capsules at night, and folic acid 1 mg 1 tablet daily. She denies experiencing any side effects with her current medications.     She states she was originally diagnosed with seizures when she was 10 years old. She believes her last generalized seizure was in 06/2021. She denies ever having a seizure in her sleep. She denies ever being prescribed a rescue medication for her seizures. She is unsure how long she has been on her current medications. She was already taking her current medications when she came to see FRANSISCO Ruth. She states she had a bad reaction to Clobazam; she had 2 seizures within 2 weeks. She was still taking lamotrigine at that time. She reports she was born a couple of weeks early. She had an MRI of her brain in the past which she believes was normal.    She does not get lab work completed regularly. She confirms she is driving. She currently works day shift at [employer]. She notes she tried to get on a flexible schedule because she occasionally does not sleep well.     She is planning to have children in the future. She has no current plans to become pregnant. She is not . She is not on birth control. No family history if Epilepsy.    The following portions of the patient's history were reviewed and updated as appropriate: allergies, current medications, past family history, past medical history, past social history, past surgical history and problem list.    Past Medical History:   Diagnosis Date   • Anxiety    • Epilepsy (HCC)    • Heart murmur        History reviewed. No pertinent surgical history.    Social History     Socioeconomic History   • Marital status: Single   Tobacco Use   • Smoking status: Never   • Smokeless tobacco: Never   Vaping Use   • Vaping Use: Never used   Substance and Sexual Activity   • Alcohol use: Yes     Comment: occas   • Drug use: Not Currently   • Sexual activity: Defer       Family History   Problem Relation Age of Onset   • Heart attack Father   "  • Tremor Father           Current Outpatient Medications:   •  ethosuximide (ZARONTIN) 250 MG capsule, Take 2 capsules by mouth 2 (Two) Times a Day., Disp: 360 capsule, Rfl: 3  •  folic acid (FOLVITE) 1 MG tablet, Take 1 tablet by mouth Daily., Disp: 90 tablet, Rfl: 3  •  lamoTRIgine (LaMICtal) 200 MG tablet, Take 1.5 tablets by mouth 2 (Two) Times a Day., Disp: 270 tablet, Rfl: 3  •  zonisamide (ZONEGRAN) 100 MG capsule, Take 2 capsules by mouth Every Night., Disp: 180 capsule, Rfl: 3  •  Midazolam (Nayzilam) 5 MG/0.1ML solution, 0.1 mL into the nostril(s) as directed by provider Daily As Needed (at onset of seizure, ,may repeat dose 1 time in 10 minutes if seizure persists)., Disp: 4 each, Rfl: 0     Review of Systems   Neurological: Negative for seizures.   Psychiatric/Behavioral: Positive for sleep disturbance.   All other systems reviewed and are negative.       Objective:  /80   Pulse 76   Resp 16   Ht 172.7 cm (67.99\")   Wt 121 kg (267 lb)   SpO2 98%   BMI 40.61 kg/m²     Neurologic Exam     Mental Status   Oriented to person, place, and time.   Speech: speech is normal   Level of consciousness: alert    Cranial Nerves   Cranial nerves II through XII intact.     Motor Exam   Muscle bulk: normal  Overall muscle tone: normal    Strength   Strength 5/5 throughout.     Gait, Coordination, and Reflexes     Gait  Gait: normal    Coordination   Finger to nose coordination: normal    Tremor   Resting tremor: absent  Intention tremor: absent  Action tremor: absent    Reflexes   Reflexes 2+ except as noted.   Right : 2+  Left : 2+      Physical Exam  Constitutional:       Appearance: Normal appearance.      Comments: BMI 40.6   Neurological:      Mental Status: She is alert and oriented to person, place, and time.      Cranial Nerves: Cranial nerves 2-12 are intact.      Motor: Motor strength is normal.      Coordination: Finger-Nose-Finger Test normal.      Gait: Gait is intact.   Psychiatric:    "      Mood and Affect: Mood is anxious. Affect is tearful.         Speech: Speech normal.         Behavior: Behavior normal.         Thought Content: Thought content normal.         Judgment: Judgment normal.         Assessment/Plan:       Diagnoses and all orders for this visit:    1. 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  Dispense: 360 capsule; Refill: 3  -     lamoTRIgine (LaMICtal) 200 MG tablet; Take 1.5 tablets by mouth 2 (Two) Times a Day.  Dispense: 270 tablet; Refill: 3  -     zonisamide (ZONEGRAN) 100 MG capsule; Take 2 capsules by mouth Every Night.  Dispense: 180 capsule; Refill: 3  -     folic acid (FOLVITE) 1 MG tablet; Take 1 tablet by mouth Daily.  Dispense: 90 tablet; Refill: 3  -     Comprehensive Metabolic Panel; Future  -     CBC & Differential; Future  -     Midazolam (Nayzilam) 5 MG/0.1ML solution; 0.1 mL into the nostril(s) as directed by provider Daily As Needed (at onset of seizure, ,may repeat dose 1 time in 10 minutes if seizure persists).  Dispense: 4 each; Refill: 0         I will consult with my collaborating neurology physician, Dr. Yasmine Jiménez, who is an epileptologist, to discuss additional pregnancy recommendations. She does not currently plan to become pregnant, but I did explain that she would have to enroll in the AED Pregnancy Registry. Also explained that she would have to have very close monitoring with a high-risk OB provider as well as with an epilepsy specialist when she does decide to become pregnant. She does verbalize understanding and agrees with this.  Dr. Jiménez recommended that once she decides she would like to become pregnant and within 6 months we would refer her back to Dr. Patricia to monitor her closely throughout pregnancy especially with the risk of zonisamide with pregnancy.  The lamotrigine would certainly be safe.  Lower doses  of ethosuximide have been used and have been okay.  I will let her know of these recommendations and she will notify us whenever she is thinking of becoming pregnant so that we can make this referral again.    She is going to follow up for now in 6 months for reevaluation of symptoms. She will call us with any additional questions or concerns prior to follow up in clinic. I did go ahead and prescribe Nayzilam just to be used if needed at onset of a seizure. She verbalizes understanding and agrees with plan. We will get some baseline labs today. She will call us with any additional questions or concerns prior to follow up in clinic    Reviewed medications, potential side effects and signs and symptoms to report. Discussed risk versus benefits of treatment plan with patient and/or family-including medications, labs and radiology that may be ordered. Addressed questions and concerns during visit. Patient and/or family verbalized understanding and agree with plan.    AS THE PROVIDER, I PERSONALLY WORE PPE DURING ENTIRE FACE TO FACE ENCOUNTER IN CLINIC WITH THE PATIENT. PATIENT ALSO WORE PPE DURING ENTIRE FACE TO FACE ENCOUNTER EXCEPT FOR A MAX OF 30 SECONDS DURING NEUROLOGICAL EVALUATION OF CRANIAL NERVES AND THEN MASK WAS PLACED BACK OVER PATIENT FACE FOR REMAINDER OF VISIT. I WASHED MY HANDS BEFORE AND AFTER VISIT.    Patient instructions include: No driving or operating heavy machinery, solo bathing or tub baths for 90 days from onset of most recent seizure, if they currently hold a license. Minimize stress as much as possible. Recommended 7-8 hours of sleep each night. Abstain from alcohol intake. Educated on Antiepileptic medications with possible side effects and signs and symptoms to report if prescribed during visit. Instructed to take seizure medication daily if prescribed. Reviewed potential seizure risk factors. Instructed to call 911 or our office if another seizure does occur.    Sudden Unexpected Death in  "Epilepsy is defined as \"the sudden, unexpected, witnessed or unwitnessed, non-traumatic, and non-drowning death in patients with epilepsy with or without evidence for a seizure, and excluding documented status epilepticus, in which postmortem examination does not reveal a structural or toxicological cause for death.\"-Epilepsy Foundation 2021. SUDEP is most commonly seen in Epilepsy patient's who's seizures are not well controlled or who have seizures during their sleep. This condition is not fully understood. To reduce your risk of SUDEP, please take your seizure medications as prescribed, keep a diary of your seizures and when they occur and if your seizures are not well controlled, please notify us so we can collaborate with you to get your seizures better controlled. If you have additional questions, please visit: [email or web address].com for more information.    During this visit the following were done:  Labs Reviewed []    Labs Ordered [x]    Radiology Reports Reviewed []    Radiology Ordered []    PCP Records Reviewed []    Referring Provider Records Reviewed []    ER Records Reviewed []    Hospital Records Reviewed []    History Obtained From Family []    Radiology Images Reviewed []    Other Reviewed [x] prior neurology notes and EMU Notes  Records Requested []      Transcribed from ambient dictation for FRANSISCO Dunn by Beatriz Lainez.  03/10/23   12:26 EST    Patient or patient representative verbalized consent to the visit recording.  I have personally performed the services described in this document as transcribed by the above individual, and it is both accurate and complete.  FRANSISCO Dunn  3/10/2023  13:27 EST        "

## 2023-03-11 LAB
ALBUMIN SERPL-MCNC: 4.4 G/DL (ref 3.5–5.2)
ALBUMIN/GLOB SERPL: 1.3 G/DL
ALP SERPL-CCNC: 122 U/L (ref 39–117)
ALT SERPL W P-5'-P-CCNC: 17 U/L (ref 1–33)
ANION GAP SERPL CALCULATED.3IONS-SCNC: 12.4 MMOL/L (ref 5–15)
AST SERPL-CCNC: 23 U/L (ref 1–32)
BILIRUB SERPL-MCNC: <0.2 MG/DL (ref 0–1.2)
BUN SERPL-MCNC: 11 MG/DL (ref 6–20)
BUN/CREAT SERPL: 11.2 (ref 7–25)
CALCIUM SPEC-SCNC: 9.7 MG/DL (ref 8.6–10.5)
CHLORIDE SERPL-SCNC: 105 MMOL/L (ref 98–107)
CO2 SERPL-SCNC: 22.6 MMOL/L (ref 22–29)
CREAT SERPL-MCNC: 0.98 MG/DL (ref 0.57–1)
EGFRCR SERPLBLD CKD-EPI 2021: 82.8 ML/MIN/1.73
GLOBULIN UR ELPH-MCNC: 3.5 GM/DL
GLUCOSE SERPL-MCNC: 80 MG/DL (ref 65–99)
POTASSIUM SERPL-SCNC: 4.5 MMOL/L (ref 3.5–5.2)
PROT SERPL-MCNC: 7.9 G/DL (ref 6–8.5)
SODIUM SERPL-SCNC: 140 MMOL/L (ref 136–145)

## 2023-03-13 NOTE — PROGRESS NOTES
Please let Sophie know that her blood work looks good overall.  Please fax a copy of labs to PCP.  Thanks, FRANSISCO Cerda.

## 2023-03-14 ENCOUNTER — TELEPHONE (OUTPATIENT)
Dept: NEUROLOGY | Facility: CLINIC | Age: 23
End: 2023-03-14
Payer: COMMERCIAL

## 2023-03-14 NOTE — TELEPHONE ENCOUNTER
----- Message from FRANSISCO Dunn sent at 3/13/2023  4:44 PM EDT -----  Please let Sophie know that her blood work looks good overall.  Please fax a copy of labs to PCP.  Thanks, FRANSISCO Cerda.

## 2023-03-15 ENCOUNTER — PRIOR AUTHORIZATION (OUTPATIENT)
Dept: NEUROLOGY | Facility: CLINIC | Age: 23
End: 2023-03-15
Payer: COMMERCIAL

## 2023-03-15 NOTE — TELEPHONE ENCOUNTER
From: Adrienne Storey  To: [facility]  Sent: 3/10/2023 1:31 PM EST  Subject: Seizure medicines and future pregnancy plans     Regis Burnsie, I wanted to let you know I discussed your case and medications with Dr. Jiménez-she recommended that once you decide you would like to become pregnant and within 6 months we would refer you back to Dr. Patricia to monitor you closely throughout pregnancy especially with the risk of zonisamide with pregnancy. The lamotrigine would certainly be safe. Lower doses of ethosuximide have been used and have been okay. We would want him to discuss risk and benefits of the zonisamide and if he wanted to adjust medications prior to pregnancy. So whenever you start thinking about pregnancy, let us know and we will refer you back to Dr. Patricia. She also agreed that your would need to be followed by a High Risk OB/GYN throughout pregnancy.    Thanks, FRANSISCO Cerda

## 2023-09-15 ENCOUNTER — OFFICE VISIT (OUTPATIENT)
Dept: NEUROLOGY | Facility: CLINIC | Age: 23
End: 2023-09-15
Payer: COMMERCIAL

## 2023-09-15 VITALS
HEART RATE: 92 BPM | HEIGHT: 68 IN | DIASTOLIC BLOOD PRESSURE: 80 MMHG | BODY MASS INDEX: 40.16 KG/M2 | WEIGHT: 265 LBS | OXYGEN SATURATION: 98 % | SYSTOLIC BLOOD PRESSURE: 140 MMHG

## 2023-09-15 DIAGNOSIS — G40.B09 NONINTRACTABLE JUVENILE MYOCLONIC EPILEPSY WITHOUT STATUS EPILEPTICUS: Primary | ICD-10-CM

## 2023-09-15 PROCEDURE — 99213 OFFICE O/P EST LOW 20 MIN: CPT | Performed by: NURSE PRACTITIONER

## 2023-09-15 RX ORDER — MIDAZOLAM 5 MG/.1ML
5 SPRAY NASAL DAILY PRN
Qty: 4 EACH | Refills: 0 | Status: SHIPPED | OUTPATIENT
Start: 2023-09-15

## 2023-09-15 NOTE — PROGRESS NOTES
Subjective:     Patient ID: Shari Marie is a 23 y.o. female.    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 She currently takes zonisamide 200 mg daily, lamotrigine 200 mg 1.5 pills twice per day. She is also on ethosuximide 250 mg 2 pills twice per day. She also takes folic acid 1 mg daily and has Nayzilam to take if needed. She recently reached out regarding asking about a mouth guard and information about migraine headaches. She has also requested a note about a service animal for her apartment complex.     On 03/25/2023, she contacted us reporting a staring type of seizure while watching TV with flashing lights. She has seen Dr. Patricia before with [facility], and I encouraged her to follow up with his clinic as she is already on 3 antiepileptic medications with complex seizure history. She is here for follow-up and refills on her medications today.    On 3/10/2023, she completed labs. CBC within normal limits. Comprehensive metabolic panel with alkaline phosphatase 122 U/L, otherwise normal.    Today, she reports she is doing well. She denies any further staring spells since 03/2023. She has not followed up with Dr. Patricia. She states the staring spell lasted for approximately 3 minutes. She denies missing any doses of her medication prior to the staring spell. She does not recall any triggers for the staring spell. She denies any generalized convulsive seizures since 06/2021. She has not had to use the Nayzilam. She denies any changes in her health overall. She denies any surgeries or hospital visits. She denies any numbness, tingling, weakness, confusion, or slurred speech. She denies any family history of epilepsy.    She denies a history of migraines. She confirms she has been grinding her teeth. She denies  headaches or TMJ clicking when she opens and closes her jaw. She has not seen a dentist in a long time. She does not have dental insurance, and this is not offered by her employer.     She plans to conceive within the next year. She is dating. She is not using any contraceptive. She plans to follow up with Dr. Patricia in the future.    She did receive the paperwork that is needed for her to have a service animal in her apartment. She has a doss doodle, and she will be 3 years old in 11/2023.    She no longer works for [employer]. She currently works at [employer]. She enjoys working there as it is less stressful.     Prior Neurological Workup and History:  [facility] in [place]. She has previously been under the care of FRANSISCO Ruth, with [facility] [place], operated by [facility] in [place], [place]. She has been followed for seizures. She has previously been following with  Neurology, Dr. Dorina Lopez, for her seizure disorder. 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 She has experienced tonic clonic activity and staring seizures in the past.      She has been on multiple seizure medications including ethosuximide (Zarontin) 250 mg 2 pills 2 x a day, zonisamide 200mg daily, and lamotrigine at 300 mg twice a day. Last convulsive seizure has been reported to be 06/2021. She also previously taken Clobazam and Keppra in the past and experienced breakthrough seizures. Last staring spell 3/25/2023.     She has also had EMU testing with Dr. Petar Patricia with [facility]. EEG at epilepsy monitoring unit, 02/18/2022 to 02/19/2022, showed total of 8 bursts of generalized bisynchronous 3 Hz spikes and wave activity lasting between 6 and 11 seconds during wakefulness. The patient did not have any seizure symptoms during that time. The implications of the findings showed incomplete control of absence seizures. Dr. Patricia increased  the dose of ethosuximide to 500 mg twice a day. She is here for follow-up, and would be due for labs.     She saw Dr. Patricia adjusted her medications in 02/2022 and she has not had any seizures since then. She confirms she tolerates all of her medications well. She is taking ethosuximide 250 mg, 2 capsules in the morning and 2 capsules at night. She is also taking lamotrigine 200 mg 1.5 tablets twice a day, zonisamide 100 mg 2 capsules at night, and folic acid 1 mg 1 tablet daily. She denies experiencing any side effects with her current medications.      She states she was originally diagnosed with seizures when she was 10 years old. She believes her last generalized seizure was in 06/2021. She denies ever having a seizure in her sleep. She denies ever being prescribed a rescue medication for her seizures. She is unsure how long she has been on her current medications. She states she had a bad reaction to Clobazam; she had 2 seizures within 2 weeks. She was still taking lamotrigine at that time. She reports she was born a couple of weeks early.   She had an MRI of her brain in the past which she believes was normal.     She does not get lab work completed regularly. She currently works at [employer].     She is planning to have children in the future. She has no current plans to become pregnant. She is not . She is not on birth control. No family history if Epilepsy.    The following portions of the patient's history were reviewed and updated as appropriate: allergies, current medications, past family history, past medical history, past social history, past surgical history, and problem list.    Past Medical History:   Diagnosis Date    Anxiety     Epilepsy     Heart murmur        History reviewed. No pertinent surgical history.    Social History     Socioeconomic History    Marital status: Single   Tobacco Use    Smoking status: Never    Smokeless tobacco: Never   Vaping Use    Vaping Use: Never used  "  Substance and Sexual Activity    Alcohol use: Yes     Comment: occas    Drug use: Not Currently    Sexual activity: Defer       Family History   Problem Relation Age of Onset    Heart attack Father     Tremor Father           Current Outpatient Medications:     ethosuximide (ZARONTIN) 250 MG capsule, Take 2 capsules by mouth 2 (Two) Times a Day., Disp: 360 capsule, Rfl: 3    folic acid (FOLVITE) 1 MG tablet, Take 1 tablet by mouth Daily., Disp: 90 tablet, Rfl: 3    lamoTRIgine (LaMICtal) 200 MG tablet, Take 1.5 tablets by mouth 2 (Two) Times a Day., Disp: 270 tablet, Rfl: 3    Midazolam (Nayzilam) 5 MG/0.1ML solution, 0.1 mL into the nostril(s) as directed by provider Daily As Needed (at onset of seizure, ,may repeat dose 1 time in 10 minutes if seizure persists)., Disp: 4 each, Rfl: 0    zonisamide (ZONEGRAN) 100 MG capsule, Take 2 capsules by mouth Every Night., Disp: 180 capsule, Rfl: 3     Review of Systems   Neurological:  Positive for seizures.   All other systems reviewed and are negative.     Objective:  /80   Pulse 92   Ht 172.7 cm (68\")   Wt 120 kg (265 lb)   SpO2 98%   BMI 40.29 kg/m²     Neurologic Exam     Mental Status   Oriented to person, place, and time.   Speech: speech is normal   Level of consciousness: alert    Cranial Nerves   Cranial nerves II through XII intact.     Motor Exam   Muscle bulk: normal  Overall muscle tone: normal    Strength   Strength 5/5 throughout.     Gait, Coordination, and Reflexes     Gait  Gait: normal    Coordination   Finger to nose coordination: normal    Tremor   Resting tremor: absent  Intention tremor: absent  Action tremor: absent    Reflexes   Reflexes 2+ except as noted.   Right : 2+  Left : 2+    Physical Exam  Constitutional:       Appearance: Normal appearance.   HENT:      Mouth/Throat:      Dentition: Normal dentition. No dental tenderness, dental caries or gum lesions.   Neurological:      Mental Status: She is alert and oriented to " person, place, and time.      Cranial Nerves: Cranial nerves 2-12 are intact.      Motor: Motor strength is normal.      Coordination: Finger-Nose-Finger Test normal.      Gait: Gait is intact.   Psychiatric:         Mood and Affect: Mood and affect normal.         Speech: Speech normal.       Assessment/Plan:       Diagnoses and all orders for this visit:    1. 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  Dispense: 4 each; Refill: 0         She is doing well overall. She on her own noticed that she was grinding her teeth, so she went ahead and got a bite guard over the counter. She currently does not have dental insurance, and this is not offered by her employer. I encouraged her to consider getting dental insurance or at least having one dental exam per year to prevent any periodontal disease. Her dental health looks excellent, so I have recommended she continue with her home regimen as well.    Overall, she feels like she is doing well. She does not even remember the details of her episode in 03/2023, but she has had no generalized seizures since 06/2021 and that one episode is unclear the cause, but she has not had any recurrent spells. She is still aware when she does decide to start trying for pregnancy that she would need to contact Dr. Patricia for his recommendations with her medications and would need to see a high risk OBGYN.     For now, she is good on refills until she follows up in clinic in 6 months. She will call us if she needs refills on anything prior to her follow-up. Today, I have encouraged her to get the Nayzilam filled to have just in case she was to have a seizure. She  "verbalizes understanding and agrees with the plan moving forward.     Reviewed medications, potential side effects and signs and symptoms to report. Discussed risk versus benefits of treatment plan with patient and/or family-including medications, labs and radiology that may be ordered. Addressed questions and concerns during visit. Patient and/or family verbalized understanding and agree with plan.    Patient instructions include: No driving or operating heavy machinery, solo bathing or tub baths for 90 days from onset of most recent seizure, if they currently hold a license. Minimize stress as much as possible. Recommended 7-8 hours of sleep each night. Abstain from alcohol intake. Educated on Antiepileptic medications with possible side effects and signs and symptoms to report if prescribed during visit. Instructed to take seizure medication daily if prescribed. Reviewed potential seizure risk factors. Instructed to call 911 or our office if another seizure does occur.    Sudden Unexpected Death in Epilepsy is defined as \"the sudden, unexpected, witnessed or unwitnessed, non-traumatic, and non-drowning death in patients with epilepsy with or without evidence for a seizure, and excluding documented status epilepticus, in which postmortem examination does not reveal a structural or toxicological cause for death.\"-Epilepsy Foundation 2021. SUDEP is most commonly seen in Epilepsy patient's who's seizures are not well controlled or who have seizures during their sleep. This condition is not fully understood. To reduce your risk of SUDEP, please take your seizure medications as prescribed, keep a diary of your seizures and when they occur and if your seizures are not well controlled, please notify us so we can collaborate with you to get your seizures better controlled. If you have additional questions, please visit: [email or web address] for more information.    During this visit the following were done:  Labs Reviewed [x] "    Labs Ordered []    Radiology Reports Reviewed []    Radiology Ordered []    PCP Records Reviewed []    Referring Provider Records Reviewed []    ER Records Reviewed []    Hospital Records Reviewed []    History Obtained From Family []    Radiology Images Reviewed []    Other Reviewed []    Records Requested []      Note to patient: The 21st Century Cures Act makes medical notes like these available to patients in the interest of transparency. However, be advised this is a medical document. It is intended as peer to peer communication. It is written in medical language and may contain abbreviations or verbiage that are unfamiliar. It may appear blunt or direct. Medical documents are intended to carry relevant information, facts as evident, and the clinical opinion of the provider.      Transcribed from ambient dictation for FRANSISCO Dunn by Shirley Gaines.  09/15/23   12:14 EDT    Patient or patient representative verbalized consent to the visit recording.  I have personally performed the services described in this document as transcribed by the above individual, and it is both accurate and complete.  FRANSISCO Dunn  9/15/2023  13:30 EDT

## 2023-09-15 NOTE — LETTER
September 15, 2023     Trenton Sena MD  [address]  [place] [address]    Patient: Shari Marie   YOB: 2000   Date of Visit: 9/15/2023       Dear Trenton Sena MD    Shari Marie was in my office today. Below is a copy of my note.    If you have questions, please do not hesitate to call me. I look forward to following Shari along with you.         Sincerely,        FRANSISCO Dunn        CC: Petar Patricia MD    Subjective:     Patient ID: Shari Marie is a 23 y.o. female.    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 She currently takes zonisamide 200 mg daily, lamotrigine 200 mg 1.5 pills twice per day. She is also on ethosuximide 250 mg 2 pills twice per day. She also takes folic acid 1 mg daily and has Nayzilam to take if needed. She recently reached out regarding asking about a mouth guard and information about migraine headaches. She has also requested a note about a service animal for her apartment complex.     On 03/25/2023, she contacted us reporting a staring type of seizure while watching TV with flashing lights. She has seen Dr. Patricia before with [facility], and I encouraged her to follow up with his clinic as she is already on 3 antiepileptic medications with complex seizure history. She is here for follow-up and refills on her medications today.    On 3/10/2023, she completed labs. CBC within normal limits. Comprehensive metabolic panel with alkaline phosphatase 122 U/L, otherwise normal.    Today, she reports she is doing well. She denies any further staring spells since 03/2023. She has not followed up with Dr. Patricia. She states the staring spell lasted for approximately 3 minutes. She denies missing any doses of her medication prior  to the staring spell. She does not recall any triggers for the staring spell. She denies any generalized convulsive seizures since 06/2021. She has not had to use the Nayzilam. She denies any changes in her health overall. She denies any surgeries or hospital visits. She denies any numbness, tingling, weakness, confusion, or slurred speech. She denies any family history of epilepsy.    She denies a history of migraines. She confirms she has been grinding her teeth. She denies headaches or TMJ clicking when she opens and closes her jaw. She has not seen a dentist in a long time. She does not have dental insurance, and this is not offered by her employer.     She plans to conceive within the next year. She is dating. She is not using any contraceptive. She plans to follow up with Dr. Patricia in the future.    She did receive the paperwork that is needed for her to have a service animal in her apartment. She has a doss doodle, and she will be 3 years old in 11/2023.    She no longer works for [employer]. She currently works at [employer]. She enjoys working there as it is less stressful.     Prior Neurological Workup and History:  [facility] in [place]. She has previously been under the care of FRANSISCO Ruth, with [facility] in [place], [place]. She has been followed for seizures. She has previously been following with  Neurology, Dr. Dorina Lopez, for her seizure disorder. 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 She has experienced tonic clonic activity and staring seizures in the past.      She has been on multiple seizure medications including ethosuximide (Zarontin) 250 mg 2 pills 2 x a day, zonisamide 200mg daily, and lamotrigine at 300 mg twice a day. Last convulsive seizure has been reported to be 06/2021. She also previously taken Clobazam and Keppra in the past and experienced breakthrough seizures. Last staring  sg 3/25/2023.     She has also had EMU testing with Dr. Petar Patricia with [facility]. EEG at epilepsy monitoring unit, 02/18/2022 to 02/19/2022, showed total of 8 bursts of generalized bisynchronous 3 Hz spikes and wave activity lasting between 6 and 11 seconds during wakefulness. The patient did not have any seizure symptoms during that time. The implications of the findings showed incomplete control of absence seizures. Dr. Patricia increased the dose of ethosuximide to 500 mg twice a day. She is here for follow-up, and would be due for labs.     She saw Dr. Patricia adjusted her medications in 02/2022 and she has not had any seizures since then. She confirms she tolerates all of her medications well. She is taking ethosuximide 250 mg, 2 capsules in the morning and 2 capsules at night. She is also taking lamotrigine 200 mg 1.5 tablets twice a day, zonisamide 100 mg 2 capsules at night, and folic acid 1 mg 1 tablet daily. She denies experiencing any side effects with her current medications.      She states she was originally diagnosed with seizures when she was 10 years old. She believes her last generalized seizure was in 06/2021. She denies ever having a seizure in her sleep. She denies ever being prescribed a rescue medication for her seizures. She is unsure how long she has been on her current medications. She states she had a bad reaction to Clobazam; she had 2 seizures within 2 weeks. She was still taking lamotrigine at that time. She reports she was born a couple of weeks early.   She had an MRI of her brain in the past which she believes was normal.     She does not get lab work completed regularly. She currently works at [employer].     She is planning to have children in the future. She has no current plans to become pregnant. She is not . She is not on birth control. No family history if Epilepsy.    The following portions of the patient's history were reviewed  "and updated as appropriate: allergies, current medications, past family history, past medical history, past social history, past surgical history, and problem list.    Past Medical History:   Diagnosis Date   • Anxiety    • Epilepsy    • Heart murmur        History reviewed. No pertinent surgical history.    Social History     Socioeconomic History   • Marital status: Single   Tobacco Use   • Smoking status: Never   • Smokeless tobacco: Never   Vaping Use   • Vaping Use: Never used   Substance and Sexual Activity   • Alcohol use: Yes     Comment: occas   • Drug use: Not Currently   • Sexual activity: Defer       Family History   Problem Relation Age of Onset   • Heart attack Father    • Tremor Father           Current Outpatient Medications:   •  ethosuximide (ZARONTIN) 250 MG capsule, Take 2 capsules by mouth 2 (Two) Times a Day., Disp: 360 capsule, Rfl: 3  •  folic acid (FOLVITE) 1 MG tablet, Take 1 tablet by mouth Daily., Disp: 90 tablet, Rfl: 3  •  lamoTRIgine (LaMICtal) 200 MG tablet, Take 1.5 tablets by mouth 2 (Two) Times a Day., Disp: 270 tablet, Rfl: 3  •  Midazolam (Nayzilam) 5 MG/0.1ML solution, 0.1 mL into the nostril(s) as directed by provider Daily As Needed (at onset of seizure, ,may repeat dose 1 time in 10 minutes if seizure persists)., Disp: 4 each, Rfl: 0  •  zonisamide (ZONEGRAN) 100 MG capsule, Take 2 capsules by mouth Every Night., Disp: 180 capsule, Rfl: 3     Review of Systems   Neurological:  Positive for seizures.   All other systems reviewed and are negative.     Objective:  /80   Pulse 92   Ht 172.7 cm (68\")   Wt 120 kg (265 lb)   SpO2 98%   BMI 40.29 kg/m²     Neurologic Exam     Mental Status   Oriented to person, place, and time.   Speech: speech is normal   Level of consciousness: alert    Cranial Nerves   Cranial nerves II through XII intact.     Motor Exam   Muscle bulk: normal  Overall muscle tone: normal    Strength   Strength 5/5 throughout.     Gait, Coordination, and " Reflexes     Gait  Gait: normal    Coordination   Finger to nose coordination: normal    Tremor   Resting tremor: absent  Intention tremor: absent  Action tremor: absent    Reflexes   Reflexes 2+ except as noted.   Right : 2+  Left : 2+    Physical Exam  Constitutional:       Appearance: Normal appearance.   HENT:      Mouth/Throat:      Dentition: Normal dentition. No dental tenderness, dental caries or gum lesions.   Neurological:      Mental Status: She is alert and oriented to person, place, and time.      Cranial Nerves: Cranial nerves 2-12 are intact.      Motor: Motor strength is normal.      Coordination: Finger-Nose-Finger Test normal.      Gait: Gait is intact.   Psychiatric:         Mood and Affect: Mood and affect normal.         Speech: Speech normal.       Assessment/Plan:       Diagnoses and all orders for this visit:    1. 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  Dispense: 4 each; Refill: 0         She is doing well overall. She on her own noticed that she was grinding her teeth, so she went ahead and got a bite guard over the counter. She currently does not have dental insurance, and this is not offered by her employer. I encouraged her to consider getting dental insurance or at least having one dental exam per year to prevent any periodontal disease. Her dental health looks excellent, so I have recommended she continue with her home regimen as well.    Overall, she feels like she is doing well. She does not even remember the details of her episode in 03/2023, but she has had no generalized seizures since 06/2021 and that one episode is unclear the cause, but she has  "not had any recurrent spells. She is still aware when she does decide to start trying for pregnancy that she would need to contact Dr. Patricia for his recommendations with her medications and would need to see a high risk OBGYN.     For now, she is good on refills until she follows up in clinic in 6 months. She will call us if she needs refills on anything prior to her follow-up. Today, I have encouraged her to get the Nayzilam filled to have just in case she was to have a seizure. She verbalizes understanding and agrees with the plan moving forward.     Reviewed medications, potential side effects and signs and symptoms to report. Discussed risk versus benefits of treatment plan with patient and/or family-including medications, labs and radiology that may be ordered. Addressed questions and concerns during visit. Patient and/or family verbalized understanding and agree with plan.    Patient instructions include: No driving or operating heavy machinery, solo bathing or tub baths for 90 days from onset of most recent seizure, if they currently hold a license. Minimize stress as much as possible. Recommended 7-8 hours of sleep each night. Abstain from alcohol intake. Educated on Antiepileptic medications with possible side effects and signs and symptoms to report if prescribed during visit. Instructed to take seizure medication daily if prescribed. Reviewed potential seizure risk factors. Instructed to call 911 or our office if another seizure does occur.    Sudden Unexpected Death in Epilepsy is defined as \"the sudden, unexpected, witnessed or unwitnessed, non-traumatic, and non-drowning death in patients with epilepsy with or without evidence for a seizure, and excluding documented status epilepticus, in which postmortem examination does not reveal a structural or toxicological cause for death.\"-Epilepsy Foundation 2021. SUDEP is most commonly seen in Epilepsy patient's who's seizures are not well controlled or who " have seizures during their sleep. This condition is not fully understood. To reduce your risk of SUDEP, please take your seizure medications as prescribed, keep a diary of your seizures and when they occur and if your seizures are not well controlled, please notify us so we can collaborate with you to get your seizures better controlled. If you have additional questions, please visit: [email or web address] for more information.    During this visit the following were done:  Labs Reviewed [x]    Labs Ordered []    Radiology Reports Reviewed []    Radiology Ordered []    PCP Records Reviewed []    Referring Provider Records Reviewed []    ER Records Reviewed []    Hospital Records Reviewed []    History Obtained From Family []    Radiology Images Reviewed []    Other Reviewed []    Records Requested []      Note to patient: The 21st Century Cures Act makes medical notes like these available to patients in the interest of transparency. However, be advised this is a medical document. It is intended as peer to peer communication. It is written in medical language and may contain abbreviations or verbiage that are unfamiliar. It may appear blunt or direct. Medical documents are intended to carry relevant information, facts as evident, and the clinical opinion of the provider.      Transcribed from ambient dictation for FRANSISCO Dunn by Shirley Gaines.  09/15/23   12:14 EDT    Patient or patient representative verbalized consent to the visit recording.  I have personally performed the services described in this document as transcribed by the above individual, and it is both accurate and complete.  FRANSISCO Dunn  9/15/2023  13:30 EDT

## 2024-02-12 DIAGNOSIS — G40.B09 NONINTRACTABLE JUVENILE MYOCLONIC EPILEPSY WITHOUT STATUS EPILEPTICUS: ICD-10-CM

## 2024-02-12 RX ORDER — ETHOSUXIMIDE 250 MG/1
500 CAPSULE ORAL 2 TIMES DAILY
Qty: 360 CAPSULE | Refills: 3 | Status: SHIPPED | OUTPATIENT
Start: 2024-02-12

## 2024-04-07 DIAGNOSIS — G40.B09 NONINTRACTABLE JUVENILE MYOCLONIC EPILEPSY WITHOUT STATUS EPILEPTICUS: ICD-10-CM

## 2024-04-08 RX ORDER — LAMOTRIGINE 200 MG/1
300 TABLET ORAL 2 TIMES DAILY
Qty: 270 TABLET | Refills: 0 | Status: SHIPPED | OUTPATIENT
Start: 2024-04-08

## 2024-04-08 RX ORDER — ZONISAMIDE 100 MG/1
200 CAPSULE ORAL NIGHTLY
Qty: 180 CAPSULE | Refills: 0 | Status: SHIPPED | OUTPATIENT
Start: 2024-04-08

## 2024-04-08 RX ORDER — FOLIC ACID 1 MG/1
1 TABLET ORAL DAILY
Qty: 90 TABLET | Refills: 0 | Status: SHIPPED | OUTPATIENT
Start: 2024-04-08

## 2024-04-08 NOTE — TELEPHONE ENCOUNTER
Rx Refill Note  Requested Prescriptions     Pending Prescriptions Disp Refills    lamoTRIgine (LaMICtal) 200 MG tablet 270 tablet 3     Sig: Take 1.5 tablets by mouth 2 (Two) Times a Day.    zonisamide (ZONEGRAN) 100 MG capsule 180 capsule 3     Sig: Take 2 capsules by mouth Every Night.    folic acid (FOLVITE) 1 MG tablet 90 tablet 3     Sig: Take 1 tablet by mouth Daily.      Last filled:3/10/23 90 day with 3 sent in 90 supply of each to get her to next follow up appt at the end of this month. Thanks!  Last office visit with prescribing clinician: 9/15/2023      Next office visit with prescribing clinician: 4/26/2024     Mervat Schafer MA  04/08/24, 15:49 EDT

## 2024-04-26 ENCOUNTER — SPECIALTY PHARMACY (OUTPATIENT)
Dept: NEUROLOGY | Facility: CLINIC | Age: 24
End: 2024-04-26
Payer: COMMERCIAL

## 2024-04-26 ENCOUNTER — LAB (OUTPATIENT)
Facility: HOSPITAL | Age: 24
End: 2024-04-26
Payer: COMMERCIAL

## 2024-04-26 ENCOUNTER — OFFICE VISIT (OUTPATIENT)
Dept: NEUROLOGY | Facility: CLINIC | Age: 24
End: 2024-04-26
Payer: COMMERCIAL

## 2024-04-26 VITALS
HEIGHT: 67 IN | OXYGEN SATURATION: 99 % | BODY MASS INDEX: 43.16 KG/M2 | DIASTOLIC BLOOD PRESSURE: 92 MMHG | HEART RATE: 86 BPM | WEIGHT: 275 LBS | SYSTOLIC BLOOD PRESSURE: 140 MMHG

## 2024-04-26 DIAGNOSIS — G40.B09 NONINTRACTABLE JUVENILE MYOCLONIC EPILEPSY WITHOUT STATUS EPILEPTICUS: ICD-10-CM

## 2024-04-26 LAB
ALBUMIN SERPL-MCNC: 4.2 G/DL (ref 3.5–5.2)
ALBUMIN/GLOB SERPL: 1.1 G/DL
ALP SERPL-CCNC: 127 U/L (ref 39–117)
ALT SERPL W P-5'-P-CCNC: 24 U/L (ref 1–33)
ANION GAP SERPL CALCULATED.3IONS-SCNC: 11.7 MMOL/L (ref 5–15)
AST SERPL-CCNC: 18 U/L (ref 1–32)
BILIRUB SERPL-MCNC: 0.3 MG/DL (ref 0–1.2)
BUN SERPL-MCNC: 11 MG/DL (ref 6–20)
BUN/CREAT SERPL: 12.6 (ref 7–25)
CALCIUM SPEC-SCNC: 9.6 MG/DL (ref 8.6–10.5)
CHLORIDE SERPL-SCNC: 104 MMOL/L (ref 98–107)
CO2 SERPL-SCNC: 24.3 MMOL/L (ref 22–29)
CREAT SERPL-MCNC: 0.87 MG/DL (ref 0.57–1)
DEPRECATED RDW RBC AUTO: 44.1 FL (ref 37–54)
EGFRCR SERPLBLD CKD-EPI 2021: 95 ML/MIN/1.73
ERYTHROCYTE [DISTWIDTH] IN BLOOD BY AUTOMATED COUNT: 12.1 % (ref 12.3–15.4)
GLOBULIN UR ELPH-MCNC: 3.8 GM/DL
GLUCOSE SERPL-MCNC: 84 MG/DL (ref 65–99)
HCT VFR BLD AUTO: 44.7 % (ref 34–46.6)
HGB BLD-MCNC: 14.7 G/DL (ref 12–15.9)
MCH RBC QN AUTO: 32 PG (ref 26.6–33)
MCHC RBC AUTO-ENTMCNC: 32.9 G/DL (ref 31.5–35.7)
MCV RBC AUTO: 97.2 FL (ref 79–97)
PLATELET # BLD AUTO: 300 10*3/MM3 (ref 140–450)
PMV BLD AUTO: 11 FL (ref 6–12)
POTASSIUM SERPL-SCNC: 4.2 MMOL/L (ref 3.5–5.2)
PROT SERPL-MCNC: 8 G/DL (ref 6–8.5)
RBC # BLD AUTO: 4.6 10*6/MM3 (ref 3.77–5.28)
SODIUM SERPL-SCNC: 140 MMOL/L (ref 136–145)
WBC NRBC COR # BLD AUTO: 4.89 10*3/MM3 (ref 3.4–10.8)

## 2024-04-26 PROCEDURE — 85027 COMPLETE CBC AUTOMATED: CPT

## 2024-04-26 PROCEDURE — 80053 COMPREHEN METABOLIC PANEL: CPT

## 2024-04-26 PROCEDURE — 36415 COLL VENOUS BLD VENIPUNCTURE: CPT

## 2024-04-26 PROCEDURE — 99213 OFFICE O/P EST LOW 20 MIN: CPT | Performed by: NURSE PRACTITIONER

## 2024-04-26 RX ORDER — LAMOTRIGINE 200 MG/1
300 TABLET ORAL 2 TIMES DAILY
Qty: 270 TABLET | Refills: 3 | Status: SHIPPED | OUTPATIENT
Start: 2024-04-26

## 2024-04-26 RX ORDER — ZONISAMIDE 100 MG/1
200 CAPSULE ORAL NIGHTLY
Qty: 180 CAPSULE | Refills: 3 | Status: SHIPPED | OUTPATIENT
Start: 2024-04-26

## 2024-04-26 RX ORDER — ETHOSUXIMIDE 250 MG/1
500 CAPSULE ORAL 2 TIMES DAILY
Qty: 360 CAPSULE | Refills: 3 | Status: SHIPPED | OUTPATIENT
Start: 2024-04-26

## 2024-04-26 RX ORDER — MIDAZOLAM 5 MG/.1ML
5 SPRAY NASAL DAILY PRN
Qty: 4 EACH | Refills: 0 | Status: SHIPPED | OUTPATIENT
Start: 2024-04-26 | End: 2024-04-26

## 2024-04-26 RX ORDER — MIDAZOLAM 5 MG/.1ML
5 SPRAY NASAL DAILY PRN
Qty: 4 EACH | Refills: 0 | Status: SHIPPED | OUTPATIENT
Start: 2024-04-26

## 2024-04-26 RX ORDER — FOLIC ACID 1 MG/1
1 TABLET ORAL DAILY
Qty: 90 TABLET | Refills: 3 | Status: SHIPPED | OUTPATIENT
Start: 2024-04-26

## 2024-04-26 NOTE — PROGRESS NOTES
Subjective:     Patient ID: Shari Marie is a 24 y.o. female.    CC:   Chief Complaint   Patient presents with    Seizures     Last seizure on 2/14/2024       HPI:   History of Present Illness  This is a very pleasant 24-year-old female, last seen in clinic on 09/15/2023. She has juvenile myoclonic epilepsy. Diagnosed at age 10.    She is currently taking zonisamide 200 mg daily, lamotrigine 200 mg 1.5 pills twice per day, and ethosuximide 250 mg 2 pills twice per day. She is also on folic acid 1 mg daily and has Nayzilam rescue nasal spray if needed.     She is here for follow-up and refills on medications today.     She reports she will be moving to [place] and will need a referral to neurology in [place].     She has followed up with Dr. Petar Patricia. He is an epileptology at [facility] on 02/28/2024. In regards to her seizures, she has been noted to be interested in pregnancy soon and so she went back to their clinic to discuss safety of anti seizure medications. They did recommend video EEG monitoring, which was to be scheduled soon, however, she opted not to pursue this. She is due for labs today.    She states she experienced a seizure on 2/14/2024, characterized by a staring episode at home. She recounts an incident where she was using the bathroom, went to her room, and woke up on her bed. She denies any tongue biting or loss of bowel or bladder control. She has not been able to obtain Nayzilam nasal spray due to its high cost of 800 dollars post-insurance. She consulted Dr. Patricia in 02/2024, but chose to cancel the EEG due to the possible risks with pregnancy and the process to adjust her anti-epileptic medications. She has chosen not to pursue pregnancy and opted for adoption. She continues to take ethosuximide, zonisamide, folic acid, and lamotrigine. She is moving to [place] in 07/2024.     She denies any other significant changes in her health, surgeries, or hospital  visits.   Her primary care physician is Dr. Sena.   Her sleep pattern is inconsistent, with occasional good sleep quality. She reported mild snoring but denies gasping for air. She feels well-rested when she wakes up in the morning. She declines sleep consult or sleep study at this time.    Her boyfriend drives.    Prior Neurological Workup and History:  [facility] in [place].     She has previously been under the care of FRANSISCO Ruth, with [facility] in [place], [place].   She has been followed for seizures.   She has previously been following with  Neurology, Dr. Dorina Lopez, for her seizure disorder. 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 She has experienced tonic clonic activity and staring seizures in the past.      Last convulsive seizure has been reported to be 06/2021. She also previously taken Clobazam and Keppra in the past and experienced breakthrough seizures. Prior staring spell was 3/25/2023.     She has also had EMU testing with Dr. Petar Patricia with [facility]. EEG at epilepsy monitoring unit, 02/18/2022 to 02/19/2022, showed total of 8 bursts of generalized bisynchronous 3 Hz spikes and wave activity lasting between 6 and 11 seconds during wakefulness. The patient did not have any seizure symptoms during that time. The implications of the findings showed incomplete control of absence seizures. Dr. Patricia increased the dose of ethosuximide to 500 mg twice a day.       She states she was originally diagnosed with seizures when she was 10 years old.   She believes her last generalized seizure was in 06/2021. She denies ever having a seizure in her sleep. She denies ever being prescribed a rescue medication for her seizures. She is unsure how long she has been on her current medications. She states she had a bad reaction to Clobazam; she had 2 seizures within 2 weeks. She  "was still taking lamotrigine at that time. She reports she was born a couple of weeks early.     She had an MRI of her brain in the past which she believes was normal.    No family history if Epilepsy.    The following portions of the patient's history were reviewed and updated as appropriate: allergies, current medications, past family history, past medical history, past social history, past surgical history, and problem list.    Past Medical History:   Diagnosis Date    Anxiety     Epilepsy     Heart murmur        History reviewed. No pertinent surgical history.    Social History     Socioeconomic History    Marital status: Single   Tobacco Use    Smoking status: Never    Smokeless tobacco: Never   Vaping Use    Vaping status: Never Used   Substance and Sexual Activity    Alcohol use: Yes     Comment: occas    Drug use: Never    Sexual activity: Yes     Partners: Male     Birth control/protection: None       Family History   Problem Relation Age of Onset    Heart attack Father     Tremor Father           Current Outpatient Medications:     ethosuximide (ZARONTIN) 250 MG capsule, Take 2 capsules by mouth 2 (Two) Times a Day., Disp: 360 capsule, Rfl: 3    folic acid (FOLVITE) 1 MG tablet, Take 1 tablet by mouth Daily., Disp: 90 tablet, Rfl: 3    lamoTRIgine (LaMICtal) 200 MG tablet, Take 1.5 tablets by mouth 2 (Two) Times a Day., Disp: 270 tablet, Rfl: 3    Midazolam (Nayzilam) 5 MG/0.1ML solution, Use 1 spray (5 mg) into one nostril as directed by provider as a single dose As Needed (at onset of seizure, ,may repeat dose 1 time in 10 minutes into alternate nostril if seizure persists)., Disp: 4 each, Rfl: 0    zonisamide (ZONEGRAN) 100 MG capsule, Take 2 capsules by mouth Every Night., Disp: 180 capsule, Rfl: 3     Review of Systems   Neurological:  Positive for seizures. Negative for headaches.   All other systems reviewed and are negative.       Objective:  /92   Pulse 86   Ht 170.2 cm (67\")   Wt 125 kg " (275 lb)   SpO2 99%   BMI 43.07 kg/m²     Neurologic Exam     Mental Status   Oriented to person, place, and time.   Speech: speech is normal   Level of consciousness: alert    Cranial Nerves   Cranial nerves II through XII intact.     Motor Exam   Muscle bulk: normal  Overall muscle tone: normal    Strength   Strength 5/5 throughout.     Gait, Coordination, and Reflexes     Gait  Gait: normal    Coordination   Finger to nose coordination: normal    Tremor   Resting tremor: absent  Intention tremor: absent  Action tremor: absent    Reflexes   Right brachioradialis: 2+  Left brachioradialis: 2+  Right biceps: 2+  Left biceps: 2+  Right patellar: 1+  Left patellar: 1+  Right achilles: 1+  Left achilles: 1+  Right : 2+  Left : 2+      Physical Exam  Constitutional:       Appearance: Normal appearance.      Comments: BMI 43.1   Neurological:      Mental Status: She is alert and oriented to person, place, and time.      Cranial Nerves: Cranial nerves 2-12 are intact.      Motor: Motor strength is normal.     Coordination: Finger-Nose-Finger Test normal.      Gait: Gait is intact.      Deep Tendon Reflexes:      Reflex Scores:       Bicep reflexes are 2+ on the right side and 2+ on the left side.       Brachioradialis reflexes are 2+ on the right side and 2+ on the left side.       Patellar reflexes are 1+ on the right side and 1+ on the left side.       Achilles reflexes are 1+ on the right side and 1+ on the left side.  Psychiatric:         Mood and Affect: Mood and affect normal.         Speech: Speech normal.         Behavior: Behavior normal.         Thought Content: Thought content normal.         Judgment: Judgment normal.         Results:  Results  None reviewed today    Assessment/Plan:     Diagnoses and all orders for this visit:    1. Nonintractable juvenile myoclonic epilepsy without status epilepticus  -     lamoTRIgine (LaMICtal) 200 MG tablet; Take 1.5 tablets by mouth 2 (Two) Times a Day.   Dispense: 270 tablet; Refill: 3  -     zonisamide (ZONEGRAN) 100 MG capsule; Take 2 capsules by mouth Every Night.  Dispense: 180 capsule; Refill: 3  -     folic acid (FOLVITE) 1 MG tablet; Take 1 tablet by mouth Daily.  Dispense: 90 tablet; Refill: 3  -     ethosuximide (ZARONTIN) 250 MG capsule; Take 2 capsules by mouth 2 (Two) Times a Day.  Dispense: 360 capsule; Refill: 3  -     Ambulatory Referral to Neurology  -     Comprehensive Metabolic Panel; Future  -     CBC (No Diff); Future  -     Midazolam (Nayzilam) 5 MG/0.1ML solution; Use 1 spray (5 mg) into one nostril as directed by provider as a single dose As Needed (at onset of seizure, ,may repeat dose 1 time in 10 minutes into alternate nostril if seizure persists).  Dispense: 4 each; Refill: 0    Other orders  -     Discontinue: Midazolam (Nayzilam) 5 MG/0.1ML solution; Use 1 spray (5 mg) into one nostril as directed by provider as a single dose As Needed (at onset of seizure, ,may repeat dose 1 time in 10 minutes into alternate nostril if seizure persists).  Dispense: 4 each; Refill: 0           Assessment & Plan  Juvenile Myoclonic Epilepsy.  She met with the neurology specialty pharmacy team today, who will obtain prior authorization for the Nayzilam rescue medication to be used as needed for her seizures.      She will undergo her laboratory tests today at our [facility] location.     She will transfer care to Neurologist in the [place] area with [facility].     She expressed understanding and agreement with this plan, having opted not to pursue pregnancy, and instead, she and her boyfriend have decided to pursue adoption.    Offered sleep study to evaluate for sleep apnea, however, she declines today. Consider in the future. We discussed increased risk with Epilepsy and untreated sleep apnea.    Patient instructions include: No driving or operating heavy machinery, solo bathing or tub baths for 90 days from onset of most  "recent seizure, if they currently hold a license. Minimize stress as much as possible. Recommended 7-8 hours of sleep each night. Abstain from alcohol intake. Educated on Antiepileptic medications with possible side effects and signs and symptoms to report if prescribed during visit. Instructed to take seizure medication daily if prescribed. Reviewed potential seizure risk factors. Instructed to call 911 or our office if another seizure does occur.    Sudden Unexpected Death in Epilepsy is defined as \"the sudden, unexpected, witnessed or unwitnessed, non-traumatic, and non-drowning death in patients with epilepsy with or without evidence for a seizure, and excluding documented status epilepticus, in which postmortem examination does not reveal a structural or toxicological cause for death.\"-Epilepsy Foundation 2021. SUDEP is most commonly seen in Epilepsy patient's who's seizures are not well controlled or who have seizures during their sleep. This condition is not fully understood. To reduce your risk of SUDEP, please take your seizure medications as prescribed, keep a diary of your seizures and when they occur and if your seizures are not well controlled, please notify us so we can collaborate with you to get your seizures better controlled. If you have additional questions, please visit: [email or web address] for more information.    Reviewed medications, potential side effects and signs and symptoms to report. Discussed risk versus benefits of treatment plan with patient and/or family-including medications, labs and radiology that may be ordered. Addressed questions and concerns during visit. Patient and/or family verbalized understanding and agree with plan.    During this visit the following were done:  Labs Reviewed []    Labs Ordered [x]    Radiology Reports Reviewed []    Radiology Ordered []    PCP Records Reviewed []    Referring Provider Records Reviewed []    ER Records Reviewed []    Hospital Records " Reviewed []    History Obtained From Family []    Radiology Images Reviewed []    Other Reviewed [x]  [facility] Epilepsy notes  Records Requested []      Patient or patient representative verbalized consent for the use of Ambient Listening during the visit with  FRANSISCO Dunn for chart documentation. 4/26/2024  11:34 EDT    Note to patient: The 21st Century Cures Act makes medical notes like these available to patients in the interest of transparency. However, be advised this is a medical document. It is intended as peer to peer communication. It is written in medical language and may contain abbreviations or verbiage that are unfamiliar. It may appear blunt or direct. Medical documents are intended to carry relevant information, facts as evident, and the clinical opinion of the provider.

## 2024-04-26 NOTE — LETTER
April 26, 2024     Trenton Sena MD  [address]  [address]  [place] [address]    Patient: Shari Marie   YOB: 2000   Date of Visit: 4/26/2024       Dear Trenton Sena MD    Shari Marie was in my office today. Below is a copy of my note.    If you have questions, please do not hesitate to call me. I look forward to following Shari along with you.         Sincerely,        FRANSISCO Dunn        CC: Gabriel Tolentino MD    Subjective:     Patient ID: Shari Marie is a 24 y.o. female.    CC:   Chief Complaint   Patient presents with   • Seizures     Last seizure on 2/14/2024       HPI:   History of Present Illness  This is a very pleasant 24-year-old female, last seen in clinic on 09/15/2023. She has juvenile myoclonic epilepsy. Diagnosed at age 10.    She is currently taking zonisamide 200 mg daily, lamotrigine 200 mg 1.5 pills twice per day, and ethosuximide 250 mg 2 pills twice per day. She is also on folic acid 1 mg daily and has Nayzilam rescue nasal spray if needed.     She is here for follow-up and refills on medications today.     She reports she will be moving to [place] and will need a referral to neurology in [place].     She has followed up with Dr. Petar Patricia. He is an epileptology at [facility] on 02/28/2024. In regards to her seizures, she has been noted to be interested in pregnancy soon and so she went back to their clinic to discuss safety of anti seizure medications. They did recommend video EEG monitoring, which was to be scheduled soon, however, she opted not to pursue this. She is due for labs today.    She states she experienced a seizure on 2/14/2024, characterized by a staring episode at home. She recounts an incident where she was using the bathroom, went to her room, and woke up on her bed. She denies any tongue biting or loss of bowel or bladder control. She has not been able to obtain Nayzilam nasal spray due to  its high cost of 800 dollars post-insurance. She consulted Dr. Patricia in 02/2024, but chose to cancel the EEG due to the possible risks with pregnancy and the process to adjust her anti-epileptic medications. She has chosen not to pursue pregnancy and opted for adoption. She continues to take ethosuximide, zonisamide, folic acid, and lamotrigine. She is moving to [place] in 07/2024.     She denies any other significant changes in her health, surgeries, or hospital visits.   Her primary care physician is Dr. Sena.   Her sleep pattern is inconsistent, with occasional good sleep quality. She reported mild snoring but denies gasping for air. She feels well-rested when she wakes up in the morning. She declines sleep consult or sleep study at this time.    Her boyfriend drives.    Prior Neurological Workup and History:  [facility] in [place].     She has previously been under the care of FRANSISCO Ruth, with [facility] in [place], [place].   She has been followed for seizures.   She has previously been following with  Neurology, Dr. Dorina Lopez, for her seizure disorder. 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 She has experienced tonic clonic activity and staring seizures in the past.      Last convulsive seizure has been reported to be 06/2021. She also previously taken Clobazam and Keppra in the past and experienced breakthrough seizures. Prior staring spell was 3/25/2023.     She has also had EMU testing with Dr. Petar Patricia with [facility]. EEG at epilepsy monitoring unit, 02/18/2022 to 02/19/2022, showed total of 8 bursts of generalized bisynchronous 3 Hz spikes and wave activity lasting between 6 and 11 seconds during wakefulness. The patient did not have any seizure symptoms during that time. The implications of the findings showed incomplete control of absence seizures. Dr. Patricia  increased the dose of ethosuximide to 500 mg twice a day.       She states she was originally diagnosed with seizures when she was 10 years old.   She believes her last generalized seizure was in 06/2021. She denies ever having a seizure in her sleep. She denies ever being prescribed a rescue medication for her seizures. She is unsure how long she has been on her current medications. She states she had a bad reaction to Clobazam; she had 2 seizures within 2 weeks. She was still taking lamotrigine at that time. She reports she was born a couple of weeks early.     She had an MRI of her brain in the past which she believes was normal.    No family history if Epilepsy.    The following portions of the patient's history were reviewed and updated as appropriate: allergies, current medications, past family history, past medical history, past social history, past surgical history, and problem list.    Past Medical History:   Diagnosis Date   • Anxiety    • Epilepsy    • Heart murmur        History reviewed. No pertinent surgical history.    Social History     Socioeconomic History   • Marital status: Single   Tobacco Use   • Smoking status: Never   • Smokeless tobacco: Never   Vaping Use   • Vaping status: Never Used   Substance and Sexual Activity   • Alcohol use: Yes     Comment: occas   • Drug use: Never   • Sexual activity: Yes     Partners: Male     Birth control/protection: None       Family History   Problem Relation Age of Onset   • Heart attack Father    • Tremor Father           Current Outpatient Medications:   •  ethosuximide (ZARONTIN) 250 MG capsule, Take 2 capsules by mouth 2 (Two) Times a Day., Disp: 360 capsule, Rfl: 3  •  folic acid (FOLVITE) 1 MG tablet, Take 1 tablet by mouth Daily., Disp: 90 tablet, Rfl: 3  •  lamoTRIgine (LaMICtal) 200 MG tablet, Take 1.5 tablets by mouth 2 (Two) Times a Day., Disp: 270 tablet, Rfl: 3  •  Midazolam (Nayzilam) 5 MG/0.1ML solution, Use 1 spray (5 mg) into one nostril as  "directed by provider as a single dose As Needed (at onset of seizure, ,may repeat dose 1 time in 10 minutes into alternate nostril if seizure persists)., Disp: 4 each, Rfl: 0  •  zonisamide (ZONEGRAN) 100 MG capsule, Take 2 capsules by mouth Every Night., Disp: 180 capsule, Rfl: 3     Review of Systems   Neurological:  Positive for seizures. Negative for headaches.   All other systems reviewed and are negative.       Objective:  /92   Pulse 86   Ht 170.2 cm (67\")   Wt 125 kg (275 lb)   SpO2 99%   BMI 43.07 kg/m²     Neurologic Exam     Mental Status   Oriented to person, place, and time.   Speech: speech is normal   Level of consciousness: alert    Cranial Nerves   Cranial nerves II through XII intact.     Motor Exam   Muscle bulk: normal  Overall muscle tone: normal    Strength   Strength 5/5 throughout.     Gait, Coordination, and Reflexes     Gait  Gait: normal    Coordination   Finger to nose coordination: normal    Tremor   Resting tremor: absent  Intention tremor: absent  Action tremor: absent    Reflexes   Right brachioradialis: 2+  Left brachioradialis: 2+  Right biceps: 2+  Left biceps: 2+  Right patellar: 1+  Left patellar: 1+  Right achilles: 1+  Left achilles: 1+  Right : 2+  Left : 2+      Physical Exam  Constitutional:       Appearance: Normal appearance.      Comments: BMI 43.1   Neurological:      Mental Status: She is alert and oriented to person, place, and time.      Cranial Nerves: Cranial nerves 2-12 are intact.      Motor: Motor strength is normal.     Coordination: Finger-Nose-Finger Test normal.      Gait: Gait is intact.      Deep Tendon Reflexes:      Reflex Scores:       Bicep reflexes are 2+ on the right side and 2+ on the left side.       Brachioradialis reflexes are 2+ on the right side and 2+ on the left side.       Patellar reflexes are 1+ on the right side and 1+ on the left side.       Achilles reflexes are 1+ on the right side and 1+ on the left " side.  Psychiatric:         Mood and Affect: Mood and affect normal.         Speech: Speech normal.         Behavior: Behavior normal.         Thought Content: Thought content normal.         Judgment: Judgment normal.         Results:  Results  None reviewed today    Assessment/Plan:     Diagnoses and all orders for this visit:    1. Nonintractable juvenile myoclonic epilepsy without status epilepticus  -     lamoTRIgine (LaMICtal) 200 MG tablet; Take 1.5 tablets by mouth 2 (Two) Times a Day.  Dispense: 270 tablet; Refill: 3  -     zonisamide (ZONEGRAN) 100 MG capsule; Take 2 capsules by mouth Every Night.  Dispense: 180 capsule; Refill: 3  -     folic acid (FOLVITE) 1 MG tablet; Take 1 tablet by mouth Daily.  Dispense: 90 tablet; Refill: 3  -     ethosuximide (ZARONTIN) 250 MG capsule; Take 2 capsules by mouth 2 (Two) Times a Day.  Dispense: 360 capsule; Refill: 3  -     Ambulatory Referral to Neurology  -     Comprehensive Metabolic Panel; Future  -     CBC (No Diff); Future  -     Midazolam (Nayzilam) 5 MG/0.1ML solution; Use 1 spray (5 mg) into one nostril as directed by provider as a single dose As Needed (at onset of seizure, ,may repeat dose 1 time in 10 minutes into alternate nostril if seizure persists).  Dispense: 4 each; Refill: 0    Other orders  -     Discontinue: Midazolam (Nayzilam) 5 MG/0.1ML solution; Use 1 spray (5 mg) into one nostril as directed by provider as a single dose As Needed (at onset of seizure, ,may repeat dose 1 time in 10 minutes into alternate nostril if seizure persists).  Dispense: 4 each; Refill: 0           Assessment & Plan  Juvenile Myoclonic Epilepsy.  She met with the neurology specialty pharmacy team today, who will obtain prior authorization for the Nayzilam rescue medication to be used as needed for her seizures.      She will undergo her laboratory tests today at our [facility] location.     She will transfer care to Neurologist in the [place] area with [facility]  "[facility].     She expressed understanding and agreement with this plan, having opted not to pursue pregnancy, and instead, she and her boyfriend have decided to pursue adoption.    Offered sleep study to evaluate for sleep apnea, however, she declines today. Consider in the future. We discussed increased risk with Epilepsy and untreated sleep apnea.    Patient instructions include: No driving or operating heavy machinery, solo bathing or tub baths for 90 days from onset of most recent seizure, if they currently hold a license. Minimize stress as much as possible. Recommended 7-8 hours of sleep each night. Abstain from alcohol intake. Educated on Antiepileptic medications with possible side effects and signs and symptoms to report if prescribed during visit. Instructed to take seizure medication daily if prescribed. Reviewed potential seizure risk factors. Instructed to call 911 or our office if another seizure does occur.    Sudden Unexpected Death in Epilepsy is defined as \"the sudden, unexpected, witnessed or unwitnessed, non-traumatic, and non-drowning death in patients with epilepsy with or without evidence for a seizure, and excluding documented status epilepticus, in which postmortem examination does not reveal a structural or toxicological cause for death.\"-Epilepsy Foundation 2021. SUDEP is most commonly seen in Epilepsy patient's who's seizures are not well controlled or who have seizures during their sleep. This condition is not fully understood. To reduce your risk of SUDEP, please take your seizure medications as prescribed, keep a diary of your seizures and when they occur and if your seizures are not well controlled, please notify us so we can collaborate with you to get your seizures better controlled. If you have additional questions, please visit: [email or web address].com for more information.    Reviewed medications, potential side effects and signs and symptoms to report. Discussed risk versus " benefits of treatment plan with patient and/or family-including medications, labs and radiology that may be ordered. Addressed questions and concerns during visit. Patient and/or family verbalized understanding and agree with plan.    During this visit the following were done:  Labs Reviewed []    Labs Ordered [x]    Radiology Reports Reviewed []    Radiology Ordered []    PCP Records Reviewed []    Referring Provider Records Reviewed []    ER Records Reviewed []    Hospital Records Reviewed []    History Obtained From Family []    Radiology Images Reviewed []    Other Reviewed [x]  [facility] Epilepsy notes  Records Requested []      Patient or patient representative verbalized consent for the use of Ambient Listening during the visit with  FRANSISCO Dunn for chart documentation. 4/26/2024  11:34 EDT    Note to patient: The 21st Century Cures Act makes medical notes like these available to patients in the interest of transparency. However, be advised this is a medical document. It is intended as peer to peer communication. It is written in medical language and may contain abbreviations or verbiage that are unfamiliar. It may appear blunt or direct. Medical documents are intended to carry relevant information, facts as evident, and the clinical opinion of the provider.

## 2024-04-29 ENCOUNTER — TELEPHONE (OUTPATIENT)
Dept: NEUROLOGY | Facility: CLINIC | Age: 24
End: 2024-04-29
Payer: COMMERCIAL

## 2024-04-29 NOTE — PROGRESS NOTES
Please let Catina know that her labs are stable and look good overall. Please fax copy of labs to PCP. Thanks, FRANSISCO Cerda

## 2024-04-29 NOTE — TELEPHONE ENCOUNTER
----- Message from Magalie RANDLE sent at 4/29/2024  8:37 AM EDT -----  Please let Catina know that her labs are stable and look good overall. Please fax copy of labs to PCP. Thanks, FRANSISCO Cerda

## 2024-05-06 ENCOUNTER — TELEPHONE (OUTPATIENT)
Dept: NEUROLOGY | Facility: CLINIC | Age: 24
End: 2024-05-06
Payer: COMMERCIAL

## 2024-05-21 ENCOUNTER — SPECIALTY PHARMACY (OUTPATIENT)
Dept: GENERAL RADIOLOGY | Facility: HOSPITAL | Age: 24
End: 2024-05-21
Payer: COMMERCIAL

## 2024-05-22 NOTE — PROGRESS NOTES
Specialty Pharmacy Patient Management Program  Neurology Initial Assessment     Shari Marie is a 24 y.o. female with Seizures seen by a Neurology provider and enrolled in the Neurology Patient Management program offered by [facility].  An initial outreach was conducted, including assessment of therapy appropriateness and specialty medication education for Nayzilam. The patient was introduced to services offered by [facility], including: regular assessments, refill coordination, curbside pick-up or mail order delivery options, prior authorization maintenance, and financial assistance programs as applicable. The patient was also provided with contact information for the pharmacy team.     Insurance Coverage & Financial Support  [employer] + Nayzilam copay card    Relevant Past Medical History and Comorbidities  Relevant medical history and concomitant health conditions were discussed with the patient. The patient's chart has been reviewed for relevant past medical history and comorbid health conditions and updated as necessary.   Past Medical History:   Diagnosis Date    Anxiety     Epilepsy     Heart murmur      Social History     Socioeconomic History    Marital status: Single   Tobacco Use    Smoking status: Never    Smokeless tobacco: Never   Vaping Use    Vaping status: Never Used   Substance and Sexual Activity    Alcohol use: Yes     Comment: occas    Drug use: Never    Sexual activity: Yes     Partners: Male     Birth control/protection: None     Problem list reviewed by Walter Thacker, Parveen on 5/22/2024 at  1:18 PM    Allergies  Known allergies and reactions were discussed with the patient. The patient's chart has been reviewed for  allergy information and updated as necessary.   Allergies   Allergen Reactions    Clobazam Seizure     Allergies reviewed by Walter Thacker, Parveen on 5/22/2024 at  1:18 PM    Relevant Laboratory Values  Common  labs          4/26/2024    11:21   Common Labs   Glucose 84    BUN 11    Creatinine 0.87    Sodium 140    Potassium 4.2    Chloride 104    Calcium 9.6    Albumin 4.2    Total Bilirubin 0.3    Alkaline Phosphatase 127    AST (SGOT) 18    ALT (SGPT) 24    WBC 4.89    Hemoglobin 14.7    Hematocrit 44.7    Platelets 300        Lab Assessment  N/A.     Current Medication List  This medication list has been reviewed with the patient and evaluated for any interactions or necessary modifications/recommendations, and updated to include all prescription medications, OTC medications, and supplements the patient is currently taking.  This list reflects what is contained in the patient's profile, which has also been marked as reviewed to communicate to other providers it is the most up to date version of the patient's current medication therapy.     Current Outpatient Medications:     ethosuximide (ZARONTIN) 250 MG capsule, Take 2 capsules by mouth 2 (Two) Times a Day., Disp: 360 capsule, Rfl: 3    folic acid (FOLVITE) 1 MG tablet, Take 1 tablet by mouth Daily., Disp: 90 tablet, Rfl: 3    lamoTRIgine (LaMICtal) 200 MG tablet, Take 1.5 tablets by mouth 2 (Two) Times a Day., Disp: 270 tablet, Rfl: 3    Midazolam (Nayzilam) 5 MG/0.1ML solution, Use 1 spray into 1 nostril as directed by provider as a single dose as needed at onset of seizure, may repeat dose 1 time in 10 minutes into alternate nostril if seizure persists., Disp: 4 each, Rfl: 0    zonisamide (ZONEGRAN) 100 MG capsule, Take 2 capsules by mouth Every Night., Disp: 180 capsule, Rfl: 3    Medicines reviewed by Walter Thacker, PharmD on 5/22/2024 at  1:18 PM    Drug Interactions  Potential drug interactions between Nayzilam, Ethosuximide, and Zonisamide (CNS depressants may enhance the adverse effects of other CNS depressants).    Initial Education Provided for Specialty Medication  The patient has been provided with the following education and any applicable  administration techniques (i.e. self-injection) have been demonstrated for the therapies indicated. All questions and concerns have been addressed prior to the patient receiving the medication, and the patient has verbalized understanding of the education and any materials provided.  Additional patient education shall be provided and documented upon request by the patient, provider or payer.      Nayzilam (Midazolam)  Medication Expectations   Why am I taking this medication? You are taking this medication for acute treatment of intermittent, stereotypic episodes of frequent seizure activity (ie, seizure clusters, acute repetitive seizures)   What should I expect while on this medication? You should expect to see seizure cessation within 10 minutes of administration.    How does the medication work? Nayzilam is a short-acting benzodiazepine that binds to stereospecific benzodiazepine receptors on the postsynaptic VIELKA neuron at several sites within the central nervous system, including the limbic system, reticular formation. Enhancement of the inhibitory effect of VIELKA on neuronal excitability results by increased neuronal membrane permeability to chloride ions. This shift in chloride ions results in hyperpolarization (a less excitable state) and stabilization.   How long will I be on this medication for? The amount of time you will be on this medication will be determined by your doctor and your response to the medication.    How do I take this medication? Take as directed on your prescription label.   What are some possible side effects? Potential side effects including, but not limited to nasal discomfort and irritation, runny nose, memory problems or loss, and feeling sleepy or drowsy. Pt verbalized understanding.   What happens if I miss a dose? Not applicable. Take only as needed.      Medication Safety   What are things I should warn my doctor immediately about? Tell doctor if you are allergic to this drug or  have any of the following health problems: narrow-angle glaucoma, asthma, emphysema, bronchitis, COPD, liver disease, kidney disease, congestive heart failure, depression, mood problems, or suicidal thoughts or behavior. If you have recently drunk a lot of alcohol or taken a drug that may slow your actions or some pain drugs. If you are pregnant or breast-feeding or plan to become pregnant or breast-feed. Also tell the doctor about any prescription or over-the-counter medications, vitamins, and herbal supplements you are taking.    What are things that I should be cautious of? Taking Nayzilam with opioid medicines, alcohol, or other central nervous system depressants (including street drugs) can cause severe drowsiness, breathing problems, coma, and death. Physical dependence, withdrawal reactions and risk of abuse, misuse, and addiction can occur. Do not share Nayzilam with others, do not drive, operate heavy machinery, do dangerous activities, drink alcohol, or take opioid medicines until you know how Nayzilam affects you or talk to a healthcare provider. Call a healthcare provider right away if you have new or worsening suicidal thoughts, unusual mood, behavior, thoughts or feelings.   What are some medications that can interact with this one? Avoid concomitant administration of Nayzilam with drugs that induce CY substrates, CYP2B6 substrates, opioids and other CNS depressants. Ask your pharmacist or health care provider before starting new medications.      Medication Storage/Handling   How should I handle this medication? Keep this medication out of reach of pets/children in original container. Keep device in sealed blister pack until ready to use. Nayzilam is for use in the nose only. There is 1 dose of Nayzilam in the nasal spray unit. Do not test spray, prime, or press the plunger before use. Peel open the blister packaging. Hold device upright with thumb on bottom of the plunger and 2 fingers on either  side of the nozzle. Do not press the plunger yet. If you do, you will lose the medicine. The person's head can be in any position and then insert nozzle into one nostril until your fingers on either side of the nozzle, are against the bottom of the nose. Press the bottom of the plunger firmly with thumb to release spray; retain nozzle in nose during dose administration; do not remove nozzle while pressing plunger. The person does not need to breathe deeply when Nayzilam is given. Remove nozzle from nostril and and discard the device and blister pack. If the seizure cluster is continuing 10 minutes after the first dose, a second dose of Nayzilam may be used if needed and administer second dose in the other nostril. Maximum treatment frequency: Treatment of 1 episode every 3 days and treatment of 5 episodes in 1 month.   How does this medication need to be stored? Store at room temperature (between 68 degrees Farenheit - 77 degrees Farenheit) away from heat/cold, sunlight or moisture. Keep Nayzilam in the blister pack that it comes in and do not open until ready to use. Do not use Nayzilam if damaged.   How should I dispose of this medication? There should not be a need to dispose of this medication unless your provider decides to change the dose or therapy. If that is the case, take to your local police station for proper disposal. Some pharmacies also have take-back bins for medication drop-off.      Resources/Support   How can I remind myself to take this medication? N/A - abortive medication.   Is financial support available?  Yes, [employer] can provide co-pay cards if you have commercial insurance or patient assistance if you have Medicare or no insurance.    Which vaccines are recommended for me? Talk to your doctor about these vaccines: Flu, Coronavirus (COVID-19), Pneumococcal (pneumonia), Tdap, Hepatitis B, Zoster (shingles)       Enter Specific Medication SmartPhrase Here    Adherence and  Self-Administration  Adherence related to the patient's specialty therapy was discussed with the patient. The Adherence segment of this outreach has been reviewed and updated.   Is there a concern with patient's ability to self administer the medication correctly and without issue?: No  Were any potential barriers to adherence identified during the initial assessment or patient education?: No  Are there any concerns regarding the patient's understanding of the importance of medication adherence?: No  Methods for Supporting Patient Adherence and/or Self-Administration: Pt is aware of proper administration technique and she/care giver may review the steps before administering the medication.     Goals of Therapy  Goals related to the patient's specialty therapy were discussed with the patient. The Patient Goals segment of this outreach has been reviewed and updated.   Goals Addressed Today        Specialty Pharmacy General Goal      Stop seizure activity within 10 minutes of administration into the nose.                Reassessment Plan & Follow-Up  Medication Therapy Changes: Nayzilam 5mg/0.1ml nasal spray- Use 1 spray into 1 nostril as directed as a single dose as needed at onset of seizure. May repeat dose 1 time in 10 minutes into alternate nostril if seizure persists.  Related Plans, Therapy Recommendations, or Therapy Problems to Be Addressed: She will use Nayzilam as needed for the cessation of seizures. Nayzilam works very quickly and usually stops seizures within 10 minutes post administration. Pt is familiar with proper administration technique for Nayzilam and can review the steps on the box if she/care giver needs a refresher. She did not have any questions or concerns with the medication at this time. We will fill Nayzilam at [facility] and ship via [employer] priority overnight on Wednesday 5/22/24, deliver Thursday 5/23/24. [facility] for future use. Pt acknowledged and she'll call with any  questions.   Pharmacist to perform regular reassessments no more than (6) months from the previous assessment.  Care Coordinator to set up future refill outreaches, coordinate prescription delivery, and escalate clinical questions to pharmacist.   Welcome information and patient satisfaction survey to be sent by specialty pharmacy team with patient's initial fill.    Attestation  Therapeutic appropriateness: Appropriate   I attest the patient was actively involved in and has agreed to the above plan of care. If the prescribed therapy is at any point deemed not appropriate based on the current or future assessments, a consultation will be initiated with the patient's specialty care provider to determine the best course of action. The revised plan of therapy will be documented along with any additional patient education provided. Discussed aforementioned material with patient via telemedicine.    Walter Thacker, PharmD  Clinic Specialty Pharmacist, Neurology  5/22/2024  14:36 EDT

## 2024-06-16 DIAGNOSIS — G40.B09 NONINTRACTABLE JUVENILE MYOCLONIC EPILEPSY WITHOUT STATUS EPILEPTICUS: ICD-10-CM

## 2024-06-17 ENCOUNTER — SPECIALTY PHARMACY (OUTPATIENT)
Dept: NEUROLOGY | Facility: CLINIC | Age: 24
End: 2024-06-17
Payer: COMMERCIAL

## 2024-06-17 RX ORDER — ZONISAMIDE 100 MG/1
200 CAPSULE ORAL NIGHTLY
Qty: 180 CAPSULE | Refills: 3 | Status: SHIPPED | OUTPATIENT
Start: 2024-06-17

## 2024-06-17 RX ORDER — LAMOTRIGINE 200 MG/1
300 TABLET ORAL 2 TIMES DAILY
Qty: 270 TABLET | Refills: 3 | Status: SHIPPED | OUTPATIENT
Start: 2024-06-17

## 2024-11-11 ENCOUNTER — SPECIALTY PHARMACY (OUTPATIENT)
Dept: NEUROLOGY | Facility: CLINIC | Age: 24
End: 2024-11-11
Payer: COMMERCIAL

## 2024-11-19 ENCOUNTER — PATIENT MESSAGE (OUTPATIENT)
Dept: NEUROLOGY | Facility: CLINIC | Age: 24
End: 2024-11-19
Payer: COMMERCIAL

## 2024-11-19 ENCOUNTER — TELEPHONE (OUTPATIENT)
Dept: NEUROLOGY | Facility: CLINIC | Age: 24
End: 2024-11-19
Payer: COMMERCIAL

## 2024-11-19 DIAGNOSIS — G40.B09 NONINTRACTABLE JUVENILE MYOCLONIC EPILEPSY WITHOUT STATUS EPILEPTICUS: Primary | ICD-10-CM

## 2024-11-19 NOTE — TELEPHONE ENCOUNTER
Pt called back, advised to reach out to [facility] provider for referral to [facility], pt asked even if it was only for refills. I reported that Narda didn't feel she had more to offer if pt had an emergency, as pt is on a medication that we don't prescribe anymore (Zarontin),Pt vu and would reach out to her [facility] provider

## 2024-11-19 NOTE — TELEPHONE ENCOUNTER
----- Message from Walter Thacker sent at 2024  9:27 AM EST -----  Regarding: Neuro Referral  DMITRY Staplesque is supposed to see her new Neurologist tomorrow (Shelbie Barnett) but the clinical coordinator of that office called her today and said the provider would not be able to see her. This provider stated they do not prescribe Ethosuximide anymore and doesn't think she would be able to help treat the patient. The new provider told the patient to reach back out to Adrienne's office and ask for a new referral to another Neurologist in [place] most likely at the [facility] epilepsy clinic.      I called and spoke to Catina this morning and she would like another referral to a different Neurologist at [facility]. The patient confirmed she still has plenty of the Lamotrigine, Zonisamide, and Ethosuximide at home and does not need new prescriptions sent. The patient confirmed she has not needed to use Nayzilam over the last 6 months and her prescription has  anyway. Please let me know if you have any questions.    Thanks,  Walter- [facility] Specialty Pharmacist

## 2024-12-12 NOTE — TELEPHONE ENCOUNTER
Called patient and left a message telling to call Jose because Adrienne had sent in 1 year of refills for both meds.  
93

## 2025-02-05 ENCOUNTER — TELEPHONE (OUTPATIENT)
Dept: NEUROLOGY | Facility: CLINIC | Age: 25
End: 2025-02-05
Payer: COMMERCIAL

## 2025-02-05 DIAGNOSIS — G40.B09 NONINTRACTABLE JUVENILE MYOCLONIC EPILEPSY WITHOUT STATUS EPILEPTICUS: ICD-10-CM

## 2025-02-05 RX ORDER — ETHOSUXIMIDE 250 MG/1
500 CAPSULE ORAL 2 TIMES DAILY
Qty: 360 CAPSULE | Refills: 1 | Status: SHIPPED | OUTPATIENT
Start: 2025-02-05

## 2025-02-05 NOTE — TELEPHONE ENCOUNTER
Please notify Catina that the Neurology provider with [facility] in [place] was unable to provider her care and recommended we refer her to the [facility] Epilepsy clinic. I am refilling her medicine. Dr. Jiménez placed that referral for her in November 2024, but I do not see where she is scheduled. Can you check on this and make sure she has a new Epilepsy provider. Thanks, Adrienne

## 2025-02-05 NOTE — TELEPHONE ENCOUNTER
Rx Refill Note  Requested Prescriptions     Pending Prescriptions Disp Refills    ethosuximide (ZARONTIN) 250 MG capsule [Pharmacy Med Name: Ethosuximide 250 MG Oral Capsule] 360 capsule 0     Sig: Take 2 capsules by mouth twice daily      Last office visit with prescribing clinician: 4/26/2024   Last telemedicine visit with prescribing clinician: Visit date not found   Next office visit with prescribing clinician: no f/u scheduled                        Would you like a call back once the refill request has been completed: [] Yes [] No    If the office needs to give you a call back, can they leave a voicemail: [] Yes [] No    Magalie Carlisle, BAILEE  02/05/25, 08:15 EST

## 2025-02-07 NOTE — TELEPHONE ENCOUNTER
Pt is scheduled for 3-6-2025 with [facility] in [facility] for 2:30 pm and aware of the appt.  I called to confirm the appt with [facility] before calling the pt and I also sent her the appt information in a my chart for her review as well as by phone

## 2025-05-04 DIAGNOSIS — G40.B09 NONINTRACTABLE JUVENILE MYOCLONIC EPILEPSY WITHOUT STATUS EPILEPTICUS: ICD-10-CM

## 2025-05-05 RX ORDER — FOLIC ACID 1 MG/1
1000 TABLET ORAL DAILY
Qty: 90 TABLET | Refills: 0 | OUTPATIENT
Start: 2025-05-05

## 2025-05-26 DIAGNOSIS — G40.B09 NONINTRACTABLE JUVENILE MYOCLONIC EPILEPSY WITHOUT STATUS EPILEPTICUS: ICD-10-CM

## 2025-05-27 RX ORDER — LAMOTRIGINE 200 MG/1
300 TABLET ORAL 2 TIMES DAILY
Qty: 270 TABLET | Refills: 0 | OUTPATIENT
Start: 2025-05-27

## 2025-08-08 DIAGNOSIS — G40.B09 NONINTRACTABLE JUVENILE MYOCLONIC EPILEPSY WITHOUT STATUS EPILEPTICUS: ICD-10-CM

## 2025-08-08 RX ORDER — ETHOSUXIMIDE 250 MG/1
500 CAPSULE ORAL 2 TIMES DAILY
Qty: 360 CAPSULE | Refills: 0 | OUTPATIENT
Start: 2025-08-08